# Patient Record
Sex: FEMALE | Race: WHITE | Employment: OTHER | URBAN - METROPOLITAN AREA
[De-identification: names, ages, dates, MRNs, and addresses within clinical notes are randomized per-mention and may not be internally consistent; named-entity substitution may affect disease eponyms.]

---

## 2017-01-04 ENCOUNTER — TELEPHONE (OUTPATIENT)
Dept: ENDOCRINOLOGY | Facility: CLINIC | Age: 52
End: 2017-01-04

## 2017-01-04 NOTE — TELEPHONE ENCOUNTER
Patient notified of lab results and recommendations. Sheela is agreeable with plan and has no further questions at this time.    Nilsa Lim LPN  Adult Endocrinology  [facility]      Per Gopal Result Note Message:  The thyroid hormone levels have normalized. I hope the hot flashes resolved. You may continue to take the same dose of levothyroxine.

## 2017-01-13 DIAGNOSIS — R27.0 ATAXIA: Primary | ICD-10-CM

## 2017-01-13 RX ORDER — BACLOFEN 20 MG/1
20 TABLET ORAL 3 TIMES DAILY
Qty: 90 TABLET | Refills: 3 | Status: SHIPPED | OUTPATIENT
Start: 2017-01-13 | End: 2017-06-02

## 2017-06-02 DIAGNOSIS — R27.0 ATAXIA: ICD-10-CM

## 2017-06-02 RX ORDER — BACLOFEN 20 MG/1
20 TABLET ORAL 3 TIMES DAILY
Qty: 90 TABLET | Refills: 3 | Status: SHIPPED | OUTPATIENT
Start: 2017-06-02 | End: 2017-10-01

## 2017-10-01 DIAGNOSIS — R27.0 ATAXIA: ICD-10-CM

## 2017-10-17 RX ORDER — BACLOFEN 20 MG/1
TABLET ORAL
Qty: 90 TABLET | Refills: 2 | Status: SHIPPED | OUTPATIENT
Start: 2017-10-17 | End: 2018-07-14

## 2017-11-09 DIAGNOSIS — E06.3 HASHIMOTO'S THYROIDITIS: ICD-10-CM

## 2017-11-10 RX ORDER — LEVOTHYROXINE SODIUM 112 UG/1
TABLET ORAL
Qty: 90 TABLET | Refills: 0 | Status: SHIPPED | OUTPATIENT
Start: 2017-11-10 | End: 2018-04-18

## 2017-11-10 NOTE — TELEPHONE ENCOUNTER
Detailed message left on patient voicemail to schedule follow up.    Nilsa Lim LPN  Adult Endocrinology  [facility]

## 2017-11-10 NOTE — TELEPHONE ENCOUNTER
Last Office Visit: 10/18/16  Future Appt: NONE    Will forward to Dr. Herron for review per Endocrine Refill Protocol.    Nilsa Lim LPN  Adult Endocrinology  [facility]

## 2018-01-30 ENCOUNTER — OFFICE VISIT (OUTPATIENT)
Dept: ENDOCRINOLOGY | Facility: CLINIC | Age: 53
End: 2018-01-30
Payer: COMMERCIAL

## 2018-01-30 VITALS
OXYGEN SATURATION: 100 % | DIASTOLIC BLOOD PRESSURE: 75 MMHG | HEIGHT: 62 IN | TEMPERATURE: 97.8 F | WEIGHT: 111.19 LBS | HEART RATE: 74 BPM | BODY MASS INDEX: 20.46 KG/M2 | SYSTOLIC BLOOD PRESSURE: 126 MMHG

## 2018-01-30 DIAGNOSIS — E06.3 HASHIMOTO'S THYROIDITIS: Primary | ICD-10-CM

## 2018-01-30 LAB
T4 FREE SERPL-MCNC: 1.07 NG/DL (ref 0.76–1.46)
TSH SERPL DL<=0.005 MIU/L-ACNC: 2.41 MU/L (ref 0.4–4)

## 2018-01-30 PROCEDURE — 84443 ASSAY THYROID STIM HORMONE: CPT | Performed by: INTERNAL MEDICINE

## 2018-01-30 PROCEDURE — 84439 ASSAY OF FREE THYROXINE: CPT | Performed by: INTERNAL MEDICINE

## 2018-01-30 PROCEDURE — 99213 OFFICE O/P EST LOW 20 MIN: CPT | Performed by: INTERNAL MEDICINE

## 2018-01-30 PROCEDURE — 36415 COLL VENOUS BLD VENIPUNCTURE: CPT | Performed by: INTERNAL MEDICINE

## 2018-01-30 NOTE — PROGRESS NOTES
The patient is seen in f/up for evaluation of hypothyroidism.     Sheela Davis is a 52 year old female with a past medical history of cerebellar ataxia and a family history of hypothyroidism, who was diagnosed with Hashimoto thyroiditis in August 2013. The thyroid ultrasound done in October 2013 revealed a heterogeneous, normal size thyroid gland.     The dose of levothyroxine was progressively increased to a maximum dose of 150 mcg daily, in 2015. Since 2015, the dose was gradually decreased to the current dose of 112 mcg daily, which the patient has been taking since 2016.     The patient reports being compliant in taking the levothyroxine on an empty stomach, a couple of hrs prior to breakfast, separate from multivitamins and calcium supplements.     The ataxia is progressively getting worse. She has been falling more frequently, up to 3-4 times a week. Denies fractures. She reports taking calcium and vitamin D supplements.     Past Medical History   Diagnosis Date     Headache      Depression      Urinary incontinence      Bowel obstruction surgeries - last 4/2014       Hepatitis A 1968     age 3   Cerebellar ataxia   Hashimoto thyroiditis  Raynaud syndrome    Past Surgical History:   Procedure Laterality Date     bowel obstruction surgery  2008 and 2009      SLING TRANSVAGINAL       SMALL BOWEL RESECTION  Jan/2013     thumb reconstruction     Partial hysterectomy in 1995     Current Medications  2000 units vitamin D daily  Prescription Medications as of 1/30/2018             levothyroxine (SYNTHROID/LEVOTHROID) 112 MCG tablet TAKE ONE TABLET BY MOUTH ONE TIME DAILY     baclofen (LIORESAL) 20 MG tablet TAKE ONE TABLET BY MOUTH THREE TIMES DAILY     escitalopram (LEXAPRO) 20 MG tablet Take 20 mg by mouth daily    multivitamin, therapeutic with minerals (MULTI-VITAMIN) TABS Take 1 tablet by mouth daily    buPROPion (WELLBUTRIN XL) 300 MG 24 hr tablet Take 150 mg by mouth 3 times daily     Calcium  Citrate-Vitamin D (CALCIUM CITRATE +D PO)     Naproxen Sodium (ALEVE PO) Take 220 mg by mouth as needed for moderate pain        Family History   Problem Relation Age of Onset     Hypothyroidism  Sisters      Psychiatry Son      ADHD     Neurological Daughter      migraines     Cardiovascular Mother      CHF     Diabetes in his 30's - type 1  Father      Diabetes dx in her late 50s - tx with insulin  Mother    2 sisters dx with insulin dependent diabetes. Many family members are overweight.     Social History   with 3 children. She denies smoking, drinking alcohol or using illicit drugs. Occupation: [employer].     Review of Systems   Systemic:             Weight stable; no significant fatigue  Eye:                      No eye symptoms   Carlito-Laryngeal:     Dysphagia for both solid and liquid foods on and off for the last 20 years - stable, no hoarseness, no cough     Breast:                  No breast symptoms  Cardiovascular:    No cardiovascular symptoms, no CP or palpitations   Pulmonary:           No pulmonary symptoms, no SOB or cough    Gastrointestinal:   No N/V, no diarrhea or constipation   Genitourinary:       Urinary incontinence, no increased thirst; urinates 1-2 times a night   Endocrine:            cold intolerance - all her life; rare hot flashes   Neurological:        headaches - 1 time a week, no tremor, occasional numbness in her hands, episodes of lightheadedness - present intermittently, sometimes lasting for days; they have been present since her teen years; now 3 times a week; h/o falls due to diff maintaining her balance  Musculoskeletal:  No joint pain; muscle pain in her legs; the left sternoclavicular joint prominence has been stable  Skin:                     No skin symptoms, no dry skin, no hair falling out   Psychological:     Depression - controlled, not suicidal               Vital Signs     Previous Weights:    Wt Readings from Last 10 Encounters:   01/30/18 50.4 kg (111 lb 3 oz)  "  10/18/16 53 kg (116 lb 13.5 oz)   05/20/16 49.8 kg (109 lb 12.8 oz)   10/13/15 51.8 kg (114 lb 4 oz)   10/14/14 51 kg (112 lb 8 oz)   04/11/14 48.1 kg (106 lb)   10/22/13 49.2 kg (108 lb 8 oz)   09/10/13 47.6 kg (105 lb)   08/09/13 46.7 kg (103 lb)        /75  Pulse 74  Temp 97.8  F (36.6  C)  Ht 1.575 m (5' 2\")  Wt 50.4 kg (111 lb 3 oz)  SpO2 100%  BMI 20.34 kg/m2     Physical Exam  General Appearance: she is well developed, thin, no distress noted; wide based gait as she transitions to the examining table   Eyes:  conjutivae and extra-ocular motions are normal.                                    pupils round and reactive to light, no lid lag, no stare    HEENT:   oropharynx clear and moist, no JVD, no bruits      thyroid mildly prominent, no definite nodules   Cardiovascular:  regular rhythm, no murmurs, distal pulse palpable, no edema  Respiratory:        chest clear, no rales, no rhonchi   Musculoskeletal:  normal tone and strength; prominent left sternoclavicular joint  Psychological:          affect and judgment normal  Skin:  no skin lesions   Neurological: knee reflexes symmetric and hyperactive, bicipital reflexes normal, no resting tremor.     Lab Results  I reviewed prior lab results documented in EPIC.     TSH   Date Value Ref Range Status   12/30/2016 2.68 0.40 - 4.00 mU/L Final     T4 Free   Date Value Ref Range Status   12/30/2016 0.97 0.76 - 1.46 ng/dL Final       Assessment     Hashimoto thyroiditis     Clinically, she does not have definite signs or symptoms suggestive of hyper or hypothyroidism. We are going to recheck the thyroid hormone levels today and adjust the dose of levothyroxine accordingly.    Orders Placed This Encounter   Procedures     TSH     T4 free                "

## 2018-01-30 NOTE — LETTER
1/30/2018         RE: Sheela Davis  [address] [place] [address]        Dear Colleague,    Thank you for referring your patient, Sheela Davis, to the [facility]. Please see a copy of my visit note below.      The patient is seen in f/up for evaluation of hypothyroidism.     Sheela Davis is a 52 year old female with a past medical history of cerebellar ataxia and a family history of hypothyroidism, who was diagnosed with Hashimoto thyroiditis in August 2013. The thyroid ultrasound done in October 2013 revealed a heterogeneous, normal size thyroid gland.     The dose of levothyroxine was progressively increased to a maximum dose of 150 mcg daily, in 2015. Since 2015, the dose was gradually decreased to the current dose of 112 mcg daily, which the patient has been taking since 2016.     The patient reports being compliant in taking the levothyroxine on an empty stomach, a couple of hrs prior to breakfast, separate from multivitamins and calcium supplements.     The ataxia is progressively getting worse. She has been falling more frequently, up to 3-4 times a week. Denies fractures. She reports taking calcium and vitamin D supplements.     Past Medical History   Diagnosis Date     Headache      Depression      Urinary incontinence      Bowel obstruction surgeries - last 4/2014       Hepatitis A 1968     age 3   Cerebellar ataxia   Hashimoto thyroiditis  Raynaud syndrome    Past Surgical History:   Procedure Laterality Date     bowel obstruction surgery  2008 and 2009      SLING TRANSVAGINAL       SMALL BOWEL RESECTION  Jan/2013     thumb reconstruction     Partial hysterectomy in 1995     Current Medications  2000 units vitamin D daily  Prescription Medications as of 1/30/2018             levothyroxine (SYNTHROID/LEVOTHROID) 112 MCG tablet TAKE ONE TABLET BY MOUTH ONE TIME DAILY     baclofen (LIORESAL) 20 MG tablet TAKE ONE TABLET BY MOUTH THREE TIMES DAILY      escitalopram (LEXAPRO) 20 MG tablet Take 20 mg by mouth daily    multivitamin, therapeutic with minerals (MULTI-VITAMIN) TABS Take 1 tablet by mouth daily    buPROPion (WELLBUTRIN XL) 300 MG 24 hr tablet Take 150 mg by mouth 3 times daily     Calcium Citrate-Vitamin D (CALCIUM CITRATE +D PO)     Naproxen Sodium (ALEVE PO) Take 220 mg by mouth as needed for moderate pain        Family History   Problem Relation Age of Onset     Hypothyroidism  Sisters      Psychiatry Son      ADHD     Neurological Daughter      migraines     Cardiovascular Mother      CHF     Diabetes in his 30's - type 1  Father      Diabetes dx in her late 50s - tx with insulin  Mother    2 sisters dx with insulin dependent diabetes. Many family members are overweight.     Social History   with 3 children. She denies smoking, drinking alcohol or using illicit drugs. Occupation: [employer].     Review of Systems   Systemic:             Weight stable; no significant fatigue  Eye:                      No eye symptoms   Carlito-Laryngeal:     Dysphagia for both solid and liquid foods on and off for the last 20 years - stable, no hoarseness, no cough     Breast:                  No breast symptoms  Cardiovascular:    No cardiovascular symptoms, no CP or palpitations   Pulmonary:           No pulmonary symptoms, no SOB or cough    Gastrointestinal:   No N/V, no diarrhea or constipation   Genitourinary:       Urinary incontinence, no increased thirst; urinates 1-2 times a night   Endocrine:            cold intolerance - all her life; rare hot flashes   Neurological:        headaches - 1 time a week, no tremor, occasional numbness in her hands, episodes of lightheadedness - present intermittently, sometimes lasting for days; they have been present since her teen years; now 3 times a week; h/o falls due to diff maintaining her balance  Musculoskeletal:  No joint pain; muscle pain in her legs; the left sternoclavicular joint prominence has been stable  Skin:  "                    No skin symptoms, no dry skin, no hair falling out   Psychological:     Depression - controlled, not suicidal               Vital Signs     Previous Weights:    Wt Readings from Last 10 Encounters:   01/30/18 50.4 kg (111 lb 3 oz)   10/18/16 53 kg (116 lb 13.5 oz)   05/20/16 49.8 kg (109 lb 12.8 oz)   10/13/15 51.8 kg (114 lb 4 oz)   10/14/14 51 kg (112 lb 8 oz)   04/11/14 48.1 kg (106 lb)   10/22/13 49.2 kg (108 lb 8 oz)   09/10/13 47.6 kg (105 lb)   08/09/13 46.7 kg (103 lb)        /75  Pulse 74  Temp 97.8  F (36.6  C)  Ht 1.575 m (5' 2\")  Wt 50.4 kg (111 lb 3 oz)  SpO2 100%  BMI 20.34 kg/m2     Physical Exam  General Appearance: she is well developed, thin, no distress noted; wide based gait as she transitions to the examining table   Eyes:  conjutivae and extra-ocular motions are normal.                                    pupils round and reactive to light, no lid lag, no stare    HEENT:   oropharynx clear and moist, no JVD, no bruits      thyroid mildly prominent, no definite nodules   Cardiovascular:  regular rhythm, no murmurs, distal pulse palpable, no edema  Respiratory:        chest clear, no rales, no rhonchi   Musculoskeletal:  normal tone and strength; prominent left sternoclavicular joint  Psychological:          affect and judgment normal  Skin:  no skin lesions   Neurological: knee reflexes symmetric and hyperactive, bicipital reflexes normal, no resting tremor.     Lab Results  I reviewed prior lab results documented in EPIC.     TSH   Date Value Ref Range Status   12/30/2016 2.68 0.40 - 4.00 mU/L Final     T4 Free   Date Value Ref Range Status   12/30/2016 0.97 0.76 - 1.46 ng/dL Final       Assessment     Hashimoto thyroiditis     Clinically, she does not have definite signs or symptoms suggestive of hyper or hypothyroidism. We are going to recheck the thyroid hormone levels today and adjust the dose of levothyroxine accordingly.    Orders Placed This Encounter "   Procedures     TSH     T4 free                    Again, thank you for allowing me to participate in the care of your patient.        Sincerely,        Megan Herron MD

## 2018-01-30 NOTE — PATIENT INSTRUCTIONS
Thank you for choosing the [facility], Department of Endocrinology. It has been a pleasure servicing you today. Please contact us at [phone] with any questions. If you need to speak to an Endocrinologist and it is after 5:00 pm or on a weekend, please call the On-Call Endocrinologist at [phone].

## 2018-01-30 NOTE — NURSING NOTE
"Sheela Davis's goals for this visit include:   Chief Complaint   Patient presents with     RECHECK       She requests these members of her care team be copied on today's visit information: Alfredo Marrero      PCP: Alfredo Marrero    Referring Provider:  No referring provider defined for this encounter.    Chief Complaint   Patient presents with     RECHECK       Initial /75  Pulse 74  Temp 97.8  F (36.6  C)  Ht 1.575 m (5' 2\")  Wt 50.4 kg (111 lb 3 oz)  SpO2 100%  BMI 20.34 kg/m2 Estimated body mass index is 20.34 kg/(m^2) as calculated from the following:    Height as of this encounter: 1.575 m (5' 2\").    Weight as of this encounter: 50.4 kg (111 lb 3 oz).  Medication Reconciliation: complete    Do you need any medication refills at today's visit? No    Betty Villarreal LPN      "

## 2018-01-30 NOTE — MR AVS SNAPSHOT
After Visit Summary   1/30/2018    Sheela Davis    MRN: [id number]           Patient Information     Date Of Birth          1965        Visit Information        Provider Department      1/30/2018 2:30 PM Megan Herron MD [facility]        Today's Diagnoses     Hashimoto's thyroiditis    -  1      Care Instructions    Thank you for choosing the [facility] Department of Endocrinology. It has been a pleasure servicing you today. Please contact us at [phone] with any questions. If you need to speak to an Endocrinologist and it is after 5:00 pm or on a weekend, please call the On-Call Endocrinologist at [phone].            Follow-ups after your visit        Follow-up notes from your care team     Return in about 1 year (around 1/30/2019) for Labs Today.      Your next 10 appointments already scheduled     Jan 29, 2019 11:30 AM CST   Return Visit with Megan Herron MD   [facility]    [address] [place] [id number] [phone]              Future tests that were ordered for you today     Open Future Orders        Priority Expected Expires Ordered    TSH Routine 1/30/2018 1/30/2019 1/30/2018    T4 free Routine 1/30/2018 1/30/2019 1/30/2018            Who to contact     If you have questions or need follow up information about today's clinic visit or your schedule please contact [facility] directly at [phone].  Normal or non-critical lab and imaging results will be communicated to you by MyChart, letter or phone within 4 business days after the clinic has received the results. If you do not hear from us within 7 days, please contact the clinic through MyChart or phone. If you have a critical or abnormal lab result, we will notify you by phone as soon as possible.  Submit refill requests through [employer] or call your pharmacy and they will  "forward the refill request to us. Please allow 3 business days for your refill to be completed.          Additional Information About Your Visit        BzzAgenthart Information     Testt is an electronic gateway that provides easy, online access to your medical records. With Testt, you can request a clinic appointment, read your test results, renew a prescription or communicate with your care team.     To sign up for Testt visit the website at [email or web address]   You will be asked to enter the access code listed below, as well as some personal information. Please follow the directions to create your username and password.     Your access code is: [email or web address]  Expires: 2018  2:51 PM     Your access code will  in 90 days. If you need help or a new code, please contact your [facility] or call [phone] for assistance.        Care EveryWhere ID     This is your Care EveryWhere ID. This could be used by other organizations to access your [facility] medical records  [id number]        Your Vitals Were     Pulse Temperature Height Pulse Oximetry BMI (Body Mass Index)       74 97.8  F (36.6  C) 1.575 m (5' 2\") 100% 20.34 kg/m2        Blood Pressure from Last 3 Encounters:   18 126/75   10/18/16 103/60   16 101/61    Weight from Last 3 Encounters:   18 50.4 kg (111 lb 3 oz)   10/18/16 53 kg (116 lb 13.5 oz)   16 49.8 kg (109 lb 12.8 oz)               Primary Care Provider Office Phone # Fax #    Alfredo Marrero [phone]       [address] [address]        Equal Access to Services     FABI HERNANDEZ : Hadii latrell Diaz, washivada mohan, qasolo kaalmada marilou, gia glaser. So [facility] [phone].    ATENCIÓN: Si habla español, tiene a shelton disposición servicios gratuitos de asistencia lingüística. Llame al [phone].    We comply with applicable federal civil rights " laws and [place] laws. We do not discriminate on the basis of race, color, national origin, age, disability, sex, sexual orientation, or gender identity.            Thank you!     Thank you for choosing [facility]  for your care. Our goal is always to provide you with excellent care. Hearing back from our patients is one way we can continue to improve our services. Please take a few minutes to complete the written survey that you may receive in the mail after your visit with us. Thank you!             Your Updated Medication List - Protect others around you: Learn how to safely use, store and throw away your medicines at www.disposemymeds.org.          This list is accurate as of 1/30/18  2:51 PM.  Always use your most recent med list.                   Brand Name Dispense Instructions for use Diagnosis    ALEVE PO      Take 220 mg by mouth as needed for moderate pain        baclofen 20 MG tablet    LIORESAL    90 tablet    TAKE ONE TABLET BY MOUTH THREE TIMES DAILY    Ataxia       buPROPion 300 MG 24 hr tablet    WELLBUTRIN XL     Take 150 mg by mouth 3 times daily    Cerebellar ataxia (H)       CALCIUM CITRATE +D PO       Cerebellar ataxia (H)       escitalopram 20 MG tablet    LEXAPRO     Take 20 mg by mouth daily        levothyroxine 112 MCG tablet    SYNTHROID/LEVOTHROID    90 tablet    TAKE ONE TABLET BY MOUTH ONE TIME DAILY    Hashimoto's thyroiditis       Multi-vitamin Tabs tablet      Take 1 tablet by mouth daily    Ataxia

## 2018-01-30 NOTE — LETTER
1/30/2018       RE: Sheela Davis  [address] [place] [address]     Dear Colleague,    Thank you for referring your patient, Sheela Davis, to the [facility]. Please see a copy of my visit note below.      The patient is seen in f/up for evaluation of hypothyroidism.     Sheela Davis is a 52 year old female with a past medical history of cerebellar ataxia and a family history of hypothyroidism, who was diagnosed with Hashimoto thyroiditis in August 2013. The thyroid ultrasound done in October 2013 revealed a heterogeneous, normal size thyroid gland.     The dose of levothyroxine was progressively increased to a maximum dose of 150 mcg daily, in 2015. Since 2015, the dose was gradually decreased to the current dose of 112 mcg daily, which the patient has been taking since 2016.     The patient reports being compliant in taking the levothyroxine on an empty stomach, a couple of hrs prior to breakfast, separate from multivitamins and calcium supplements.     The ataxia is progressively getting worse. She has been falling more frequently, up to 3-4 times a week. Denies fractures. She reports taking calcium and vitamin D supplements.     Past Medical History   Diagnosis Date     Headache      Depression      Urinary incontinence      Bowel obstruction surgeries - last 4/2014       Hepatitis A 1968     age 3   Cerebellar ataxia   Hashimoto thyroiditis  Raynaud syndrome    Past Surgical History:   Procedure Laterality Date     bowel obstruction surgery  2008 and 2009      SLING TRANSVAGINAL       SMALL BOWEL RESECTION  Jan/2013     thumb reconstruction     Partial hysterectomy in 1995     Current Medications  2000 units vitamin D daily  Prescription Medications as of 1/30/2018             levothyroxine (SYNTHROID/LEVOTHROID) 112 MCG tablet TAKE ONE TABLET BY MOUTH ONE TIME DAILY     baclofen (LIORESAL) 20 MG tablet TAKE ONE  TABLET BY MOUTH THREE TIMES DAILY     escitalopram (LEXAPRO) 20 MG tablet Take 20 mg by mouth daily    multivitamin, therapeutic with minerals (MULTI-VITAMIN) TABS Take 1 tablet by mouth daily    buPROPion (WELLBUTRIN XL) 300 MG 24 hr tablet Take 150 mg by mouth 3 times daily     Calcium Citrate-Vitamin D (CALCIUM CITRATE +D PO)     Naproxen Sodium (ALEVE PO) Take 220 mg by mouth as needed for moderate pain        Family History   Problem Relation Age of Onset     Hypothyroidism  Sisters      Psychiatry Son      ADHD     Neurological Daughter      migraines     Cardiovascular Mother      CHF     Diabetes in his 30's - type 1  Father      Diabetes dx in her late 50s - tx with insulin  Mother    2 sisters dx with insulin dependent diabetes. Many family members are overweight.     Social History   with 3 children. She denies smoking, drinking alcohol or using illicit drugs. Occupation: [employer].     Review of Systems   Systemic:             Weight stable; no significant fatigue  Eye:                      No eye symptoms   Carlito-Laryngeal:     Dysphagia for both solid and liquid foods on and off for the last 20 years - stable, no hoarseness, no cough     Breast:                  No breast symptoms  Cardiovascular:    No cardiovascular symptoms, no CP or palpitations   Pulmonary:           No pulmonary symptoms, no SOB or cough    Gastrointestinal:   No N/V, no diarrhea or constipation   Genitourinary:       Urinary incontinence, no increased thirst; urinates 1-2 times a night   Endocrine:            cold intolerance - all her life; rare hot flashes   Neurological:        headaches - 1 time a week, no tremor, occasional numbness in her hands, episodes of lightheadedness - present intermittently, sometimes lasting for days; they have been present since her teen years; now 3 times a week; h/o falls due to diff maintaining her balance  Musculoskeletal:  No joint pain; muscle pain in her legs; the left sternoclavicular  "joint prominence has been stable  Skin:                     No skin symptoms, no dry skin, no hair falling out   Psychological:     Depression - controlled, not suicidal               Vital Signs     Previous Weights:    Wt Readings from Last 10 Encounters:   01/30/18 50.4 kg (111 lb 3 oz)   10/18/16 53 kg (116 lb 13.5 oz)   05/20/16 49.8 kg (109 lb 12.8 oz)   10/13/15 51.8 kg (114 lb 4 oz)   10/14/14 51 kg (112 lb 8 oz)   04/11/14 48.1 kg (106 lb)   10/22/13 49.2 kg (108 lb 8 oz)   09/10/13 47.6 kg (105 lb)   08/09/13 46.7 kg (103 lb)        /75  Pulse 74  Temp 97.8  F (36.6  C)  Ht 1.575 m (5' 2\")  Wt 50.4 kg (111 lb 3 oz)  SpO2 100%  BMI 20.34 kg/m2     Physical Exam  General Appearance: she is well developed, thin, no distress noted; wide based gait as she transitions to the examining table   Eyes:  conjutivae and extra-ocular motions are normal.                                    pupils round and reactive to light, no lid lag, no stare    HEENT:   oropharynx clear and moist, no JVD, no bruits      thyroid mildly prominent, no definite nodules   Cardiovascular:  regular rhythm, no murmurs, distal pulse palpable, no edema  Respiratory:        chest clear, no rales, no rhonchi   Musculoskeletal:  normal tone and strength; prominent left sternoclavicular joint  Psychological:          affect and judgment normal  Skin:  no skin lesions   Neurological: knee reflexes symmetric and hyperactive, bicipital reflexes normal, no resting tremor.     Lab Results  I reviewed prior lab results documented in EPIC.     TSH   Date Value Ref Range Status   12/30/2016 2.68 0.40 - 4.00 mU/L Final     T4 Free   Date Value Ref Range Status   12/30/2016 0.97 0.76 - 1.46 ng/dL Final       Assessment     Hashimoto thyroiditis     Clinically, she does not have definite signs or symptoms suggestive of hyper or hypothyroidism. We are going to recheck the thyroid hormone levels today and adjust the dose of levothyroxine " accordingly.    Orders Placed This Encounter   Procedures     TSH     T4 free                    Again, thank you for allowing me to participate in the care of your patient.      Sincerely,    Megan Herron MD

## 2018-04-18 DIAGNOSIS — E06.3 HASHIMOTO'S THYROIDITIS: ICD-10-CM

## 2018-04-19 RX ORDER — LEVOTHYROXINE SODIUM 112 UG/1
TABLET ORAL
Qty: 90 TABLET | Refills: 1 | Status: SHIPPED | OUTPATIENT
Start: 2018-04-19 | End: 2018-10-15

## 2018-07-14 DIAGNOSIS — R27.0 ATAXIA: ICD-10-CM

## 2018-07-17 RX ORDER — BACLOFEN 20 MG/1
TABLET ORAL
Qty: 90 TABLET | Refills: 1 | Status: SHIPPED | OUTPATIENT
Start: 2018-07-17 | End: 2018-09-14

## 2018-08-10 ENCOUNTER — TELEPHONE (OUTPATIENT)
Dept: NEUROLOGY | Facility: CLINIC | Age: 53
End: 2018-08-10

## 2018-08-10 NOTE — TELEPHONE ENCOUNTER
Health Call Center    Phone Message    May a detailed message be left on voicemail: yes    Reason for Call: Medication Refill Request    Has the patient contacted the pharmacy for the refill? Yes   Name of medication being requested: baclofen (LIORESAL) 20 MG   Provider who prescribed the medication: Dr. Oviedo  Pharmacy: [id number] [place] [id number] [facility]  Date medication is needed: As soon as possible         Action Taken: Message routed to:  [facility]

## 2018-08-10 NOTE — TELEPHONE ENCOUNTER
Called pt and had to leave a message that she needs to make a return appointment with Dr. Oviedo to receive more refills on her baclofen since has been 2 years since her last appointment.  Called her pharmacy and she has enough medication to last until Sept. 18.  Left phone number for pt to return call to make an appointment.

## 2018-08-14 NOTE — TELEPHONE ENCOUNTER
[facility] Call Center    Phone Message    May a detailed message be left on voicemail: yes    Reason for Call: Other: Calling back the Ataxia clinic to schedule.  Please call this Pt today anytime and tomorrow after 4pm.     Action Taken: Message routed to:  [facility] Neuro  Called pt at 2:30pm and no answer, message was left.    Called pt on 8/17/2018 to arrange return appointment in the ataxia clinic, no answer message left and also asked if there was another phone number that could be used to get a hold of the patient.    Called pt again and also her 's phone to try and arrange a follow up appointment. No answer, message was left on both phones to return call and if there is another number that Shavon can be reached.        Called pt's cell phone and her 's phone and no answer. Left message that Sheela has been scheduled for Sept 14 at 9:00am to see Dr. Oviedo. Pt is running out of medication and needs to be seen again. Asked them to call this writer back.

## 2018-09-08 DIAGNOSIS — R27.0 ATAXIA: ICD-10-CM

## 2018-09-11 ENCOUNTER — TELEPHONE (OUTPATIENT)
Dept: NEUROLOGY | Facility: CLINIC | Age: 53
End: 2018-09-11

## 2018-09-11 RX ORDER — BACLOFEN 20 MG/1
TABLET ORAL
Qty: 90 TABLET | Refills: 0 | OUTPATIENT
Start: 2018-09-11

## 2018-09-11 NOTE — TELEPHONE ENCOUNTER
Called pt's home and 's cell phone again to remind them that pt has an appointment on Sept 14 at 9am with Dr. Oviedo. She has not been here for over a year and needs to be seen to have baclofen refilled.  No answer at either number.  Called pharmacy and gave this information to pharmacist also. They will also try and get a hold of pt to tell her about Sept 14 appointment.

## 2018-09-11 NOTE — TELEPHONE ENCOUNTER
Pt has been called several times. An appointment has been made for her to see Dr. Oviedo on Sept 14 at 9am. Pharmacy has been notified. Messages left on both pt and pt's husbands phone in regards to appointment.

## 2018-09-13 ENCOUNTER — PATIENT OUTREACH (OUTPATIENT)
Dept: CARE COORDINATION | Facility: CLINIC | Age: 53
End: 2018-09-13

## 2018-09-14 ENCOUNTER — OFFICE VISIT (OUTPATIENT)
Dept: NEUROLOGY | Facility: CLINIC | Age: 53
End: 2018-09-14
Payer: MEDICARE

## 2018-09-14 ENCOUNTER — NURSE TRIAGE (OUTPATIENT)
Dept: NURSING | Facility: CLINIC | Age: 53
End: 2018-09-14

## 2018-09-14 VITALS
DIASTOLIC BLOOD PRESSURE: 68 MMHG | BODY MASS INDEX: 20.02 KG/M2 | OXYGEN SATURATION: 96 % | HEIGHT: 63 IN | SYSTOLIC BLOOD PRESSURE: 106 MMHG | TEMPERATURE: 97.4 F | WEIGHT: 113 LBS | HEART RATE: 81 BPM

## 2018-09-14 DIAGNOSIS — R25.2 SPASTICITY: ICD-10-CM

## 2018-09-14 DIAGNOSIS — R27.0 ATAXIA: Primary | ICD-10-CM

## 2018-09-14 DIAGNOSIS — G11.9 CEREBELLAR ATAXIA (H): ICD-10-CM

## 2018-09-14 PROBLEM — F34.1 PERSISTENT DEPRESSIVE DISORDER: Status: ACTIVE | Noted: 2018-02-09

## 2018-09-14 LAB — THYROPEROXIDASE AB SERPL-ACNC: 922 IU/ML

## 2018-09-14 PROCEDURE — 86376 MICROSOMAL ANTIBODY EACH: CPT | Performed by: PSYCHIATRY & NEUROLOGY

## 2018-09-14 PROCEDURE — 40000803 ZZHCL STATISTIC DNA ISOL HIGH PURITY: Performed by: GENETIC COUNSELOR, MS

## 2018-09-14 RX ORDER — OMEPRAZOLE 10 MG/1
20 CAPSULE, DELAYED RELEASE ORAL DAILY
Qty: 30 CAPSULE | Refills: 0 | Status: SHIPPED | OUTPATIENT
Start: 2018-09-14

## 2018-09-14 RX ORDER — BACLOFEN 20 MG/1
20 TABLET ORAL 3 TIMES DAILY
Qty: 90 TABLET | Refills: 3 | Status: SHIPPED | OUTPATIENT
Start: 2018-09-14 | End: 2019-01-08

## 2018-09-14 RX ORDER — PREDNISONE 10 MG/1
10 TABLET ORAL SEE ADMIN INSTRUCTIONS
Qty: 60 EACH | Refills: 0 | Status: SHIPPED | OUTPATIENT
Start: 2018-09-14 | End: 2020-05-13

## 2018-09-14 ASSESSMENT — PAIN SCALES - GENERAL: PAINLEVEL: NO PAIN (0)

## 2018-09-14 NOTE — LETTER
9/14/2018       RE: Sheela Davis  [address] [place] [address]     Dear Colleague,    Thank you for referring your patient, Sheela Davis, to the [facility] at [facility]. Please see a copy of my visit note below.    GENETIC COUNSELING-Ataxia clinic  I met again today with Sheela Davis to review family history and discuss genetic testing options.  We spent 15 minutes together in clinic reviewing genetic testing and updating family history.    PRIOR GENETIC TESTING RESULTS:  Sheela previously had the autosomal dominant ataxia panel through [employer].  This testing excluded the diagnoses of SCA1 (30 and 31 repeats),   SCA2 (22 and 22 repeats), SCA3 (14 and 22 repeats), SCA6 (11 and 12 repeats),   SCA7 (10 and 10 repeats), SCA8 (24 and 27 repeats), SCA10 (12 and 14 repeats),   SCA14 (normal sequence), SCA17 (36 and 38 repeats), and DRPLA (14 and 16   repeats).      She had been previously tested for ataxia and spasticity by next generation sequencing   AFG3L2, ANO10, APTX, ATL1, RUT, BSCL2,K88WHE4, CABC1, CYP7B1, EEF2, FA2H, FGF14, FXN, GJC2, HSPD1, IFRD1,  ITPR1, SZDW9050, MBAE9665, KIF5A, L1CAM, NIPA1, OPA1, PDYN, PEX10, PEX2,  PLP1, PNPLA6, POLG, REEP1, SACS, SETX, UTD40B6, SPAST, SPG11, SPG20,  SPG21, SPG7, SYNE1, TGM6, TTBK2, ZFYVE26, ZFYVE27    this testing identified a single variant of uncertain clinical significance in the SYNE1 gene [SYNE1:NM_182961.2 c.92110F>C p.Dig4953Gvl].  Since the time that testing was performed, there is additional evidence that this single variant is not likely to be the cause of her symptoms- it is present in at least 1 homozygous individual in the gnomAD database.    I updated her family history. Since we last reviewed her family history, her oldest daughter has had a healthy son who is now 4 years old.  She reports no other family members that clearly have the same thing as her in terms of  neurologic disease.    We discussed additional testing that could be performed. I explained that there are several dozen newer genes that have been discovered that cause ataxia and/or spasticity.  In addition, her initial testing did not include assessment for copy number variation, which is an important cause of some forms of ataxia and HSP.  We discussed the risks, benefits, limitations, and utility of genetic testing.  Sheela's very young age of onset (22) strongly argues for a genetic basis for her symptoms.  Identifying a precise diagnosis would allow us to better define her prognosis and would provide critical reproductive information as her children are now having children of their own.  We discussed autosomal dominant and autosomal recessive patterns of inheritance and the implications of these patterns in terms of her children's risks.  We discussed possible results including positive, negative, and uncertain results.    She provided written consent for this testing and we will check on prior authorization.    We are requesting prior authorization for the following:    Gene list   ABCB7,ABHD12,ACO2,ADAR,ADCK3,AFG3L2,AIFM1,BCPW29J5,AMPD2,ANO10,AP4B1,AP4E1,AP4M1,AP4S1,AP5Z1,APTX,QHY9TN7,ARSI,ATCAY,ATL1,RUT,IVR47K0,ATP2B3,ATP7B,AT,U9TJWPA4,BICD2,BSCL2,C03win5,L24cnv31,D77rxv01,CA8,AWSLP9X,MMBAH2M,CACNB4,CAMTA1,CAPN1,OIYY30C,CCT5,CLCN2,COQ2,CPT1C,ARS59L9,AJX97L3,CYP2U1,CYP7B1,DDHD1,DDHD2,DNAJC19,DNAJC3,DSTYK,EBF3,EEF2,ELOVL4,ELOVL5,ENTPD1,EPT1,ERLIN1,ERLIN2,FA2H,FARS2,FAT2,FGF14,FLRT1,FLVCR1,FXN,GBA2,GJC2,GM2A,GRID2,GRM1,HACE1,HEXA,HEXB,HSPD1,IBA57,IFIH1,IFRD1,ITPR1,KCNA1,KCNA2,KCNC3,KCND3,KCNJ10,NHFV4391,DPIG8952,LRJREN048,KIF1A,KIF1C,KIF5A,L1CAM,LYST,MAG,MARS,MARS2,MME,MRE11A,MTPAP,NIPA1,NPC1,NPC2,NT5C2,OPA1,PCNA,PDYN,PIK3R5,PLD3,PLP1,PNKP,PNPLA6,POLG,POLR3A,PRICKLE1,PRKCG,PTRH2,QUP1VVO1,REEP1,REEP2,QTFWZE4V,MGA937,NAJ700,RNU12,RORA,RTN2,SACS,SAMD9L,SCN8A,SCYL1,SERAC1,SETX,SIL1,SFU41T0,SLC1A3,FZS54X1,SLC9A1,SNX14,SPAST,SPG11,SPG20,SPG21,SPG7,SPTBN2,STUB1,SYNE1,SYT14,TDP1,TDP2,TECPR2,TENM3,TFG,TGM6,THSX387,EOMMLJ54,TRPC3,TTBK2,TTPA,TUBB2A,TUBB4A,UBA5,UCHL1,USP8,VAMP1,VLDLR,VPS37A,VWA3B,WDR48,WDR81,WWOX,XRCC1,ZFR,ZFYVE26,ZFYVE27,UZG471    CPT codes  81404x8, 81405x7, 81406x12, 81407x6, 81408x2, 81479x311    Again, thank you for allowing me to participate in the care of your patient.      Sincerely,    Tanner Leggett, GC

## 2018-09-14 NOTE — NURSING NOTE
Pt states she does have headaches off and on. She said her vision is fine. She said she chokes when she eats and drinks, more so when she drink. She had a swallow study once years ago. She said her arm strength remains the same. She does lift weights every day.She works as a  so she is constantly moving. She said she falls down almost every day. She gets off balance. She uses her cane at work. 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 She uses a walker at home outside. She said she has problems with urology, she is going to see a urologist next week and is having a colonoscopy soon. She said the baclofen is helping but she said she doesn't feel differently if she doesn't take it.

## 2018-09-14 NOTE — PATIENT INSTRUCTIONS
Plan:   - Lab work: anti-TPO Ab, anti-thyroglobulin Ab  - Prednisone trial:    - Take 60mg x 3 days, 50mg x 3 days, 40mg x 3 days, 30mg x 3 days, 20mg x 3 days, 10mg x 3 days, 5mg x 3 day then stop.   - Take over the counter Omeprazole while taking the prednisone. You may stop after the prednisone is complete.   - Journal your symptoms during this time to see if you have a response to prednisone. Test yourself by doing the tandem stand and time how long you can stand unaided. Do this at least once a day.    - Please call the clinic and let Annalise (nurse) know how you are doing after your prednisone taper is completed  - Discussed trial of medical cannabis for treatment of BLE spasticity   - After you have completed the prednisone, you may consider decreasing baclofen to 10 mg three times a day x 1 week then off    - Restart if spasticity worsens   - Restart at 10mg three times a day x 1 week then increase to 20mg three times a day and continue on this thereafter    We will see you back 3 months.

## 2018-09-14 NOTE — PROGRESS NOTES
GENETIC COUNSELING-Ataxia clinic  I met again today with Sheela Davis to review family history and discuss genetic testing options.  We spent 15 minutes together in clinic reviewing genetic testing and updating family history.    PRIOR GENETIC TESTING RESULTS:  Sheela previously had the autosomal dominant ataxia panel through [employer].  This testing excluded the diagnoses of SCA1 (30 and 31 repeats),   SCA2 (22 and 22 repeats), SCA3 (14 and 22 repeats), SCA6 (11 and 12 repeats),   SCA7 (10 and 10 repeats), SCA8 (24 and 27 repeats), SCA10 (12 and 14 repeats),   SCA14 (normal sequence), SCA17 (36 and 38 repeats), and DRPLA (14 and 16   repeats).      She had been previously tested for ataxia and spasticity by next generation sequencing   AFG3L2, ANO10, APTX, ATL1, RUT, BSCL2,F97CXH4, CABC1, CYP7B1, EEF2, FA2H, FGF14, FXN, GJC2, HSPD1, IFRD1,  ITPR1, FWZB9526, OJYC6630, KIF5A, L1CAM, NIPA1, OPA1, PDYN, PEX10, PEX2,  PLP1, PNPLA6, POLG, REEP1, SACS, SETX, OKL89C0, SPAST, SPG11, SPG20,  SPG21, SPG7, SYNE1, TGM6, TTBK2, ZFYVE26, ZFYVE27    this testing identified a single variant of uncertain clinical significance in the SYNE1 gene [SYNE1:NM_182961.2 c.69401Z>C p.Rvm2902Sfs].  Since the time that testing was performed, there is additional evidence that this single variant is not likely to be the cause of her symptoms- it is present in at least 1 homozygous individual in the gnomAD database.    I updated her family history. Since we last reviewed her family history, her oldest daughter has had a healthy son who is now 4 years old.  She reports no other family members that clearly have the same thing as her in terms of neurologic disease.    We discussed additional testing that could be performed. I explained that there are several dozen newer genes that have been discovered that cause ataxia and/or spasticity.  In addition, her initial testing did not include assessment for copy number variation, which is an  important cause of some forms of ataxia and HSP.  We discussed the risks, benefits, limitations, and utility of genetic testing.  Sheela's very young age of onset (22) strongly argues for a genetic basis for her symptoms.  Identifying a precise diagnosis would allow us to better define her prognosis and would provide critical reproductive information as her children are now having children of their own.  We discussed autosomal dominant and autosomal recessive patterns of inheritance and the implications of these patterns in terms of her children's risks.  We discussed possible results including positive, negative, and uncertain results.    She provided written consent for this testing and we will check on prior authorization.    Jorge Leggett MS, [facility]  Licensed Genetic Counselor    We are requesting prior authorization for the following:    Gene list  ABCB7,ABHD12,ACO2,ADAR,ADCK3,AFG3L2,AIFM1,BICA11Q0,AMPD2,ANO10,AP4B1,AP4E1,AP4M1,AP4S1,AP5Z1,APTX,LJZ6XE5,ARSI,ATCAY,ATL1,RUT,GHL24O6,ATP2B3,ATP7B,AT,B7IYPBI7,BICD2,BSCL2,P35irf1,O98teh19,C82tev40,CA8,WKPFK1F,YKWSY3B,CACNB4,CAMTA1,CAPN1,GNGT58J,CCT5,CLCN2,COQ2,CPT1C,GZV53S9,JWZ63L2,CYP2U1,CYP7B1,DDHD1,DDHD2,DNAJC19,DNAJC3,DSTYK,EBF3,EEF2,ELOVL4,ELOVL5,ENTPD1,EPT1,ERLIN1,ERLIN2,FA2H,FARS2,FAT2,FGF14,FLRT1,FLVCR1,FXN,GBA2,GJC2,GM2A,GRID2,GRM1,HACE1,HEXA,HEXB,HSPD1,IBA57,IFIH1,IFRD1,ITPR1,KCNA1,KCNA2,KCNC3,KCND3,KCNJ10,OVOV0640,OTOA5025,CZCUXZ705,KIF1A,KIF1C,KIF5A,L1CAM,LYST,MAG,MARS,MARS2,MME,MRE11A,MTPAP,NIPA1,NPC1,NPC2,NT5C2,OPA1,PCNA,PDYN,PIK3R5,PLD3,PLP1,PNKP,PNPLA6,POLG,POLR3A,PRICKLE1,PRKCG,PTRH2,FEL8PFW6,REEP1,REEP2,EQTLGG3C,BHD785,TGA595,RNU12,RORA,RTN2,SACS,SAMD9L,SCN8A,SCYL1,SERAC1,SETX,SIL1,UTD93W7,SLC1A3,GRK30Q3,SLC9A1,SNX14,SPAST,SPG11,SPG20,SPG21,SPG7,SPTBN2,STUB1,SYNE1,SYT14,TDP1,TDP2,TECPR2,TENM3,TFG,TGM6,KIVJ568,JZJZJY49,TRPC3,TTBK2,TTPA,TUBB2A,TUBB4A,UBA5,UCHL1,USP8,VAMP1,VLDLR,VPS37A,VWA3B,WDR48,WDR81,WWOX,XRCC1,ZFR,ZFYVE26,ZFYVE27,AIR190    CPT  codes  81404x8, 81405x7, 81406x12, 81407x6, 81408x2, 81479x311

## 2018-09-14 NOTE — LETTER
2018       RE: Sheela Davis  [address] [place] [address]     Dear Colleague,    Thank you for referring your patient, Sheela Davis, to the [facility] NEUROLOGY at [facility]. Please see a copy of my visit note below.    Department of Neurology  Movement Disorders Division     Patient: Sheela Davis   MRN: [id number]   : 1965   Date of Visit: 2018     History of Present Illness  Ms. Davis is a 53 year old L handed female with PMH of depression, hepatitis A, Hashimoto thyroiditis that presents to Neurology Movement clinic for follow up regarding management of cerebellar ataxia. Patient was last seen on 2016 where he was continued on baclofen 20mg tid, which she has been on for several years for BLE spasticity. She was also trialed on a prednisone taper for possible autoimmune related cerebellar atrophy. She was also referred to PT, swallow study and a sleep study.     History obtained from patient and accompanied by . Patient reports that she looses her balance every day and may be associated with a fall. She uses a cane with most walks and a walker outside. She last fell a few days ago. She tried to exercise every night. Doesn't recall if prednisone helped, doesn't think so. Baclofen doesn't notice a difference even after skipping for 3 days many years ago. Many days skips her afternoon dose.     She reports that PT helped but doesn't continue exercises after PT is done. Swallow study completed with no issues. Reports choking on liquids more than food when she eats too fast, about once to twice a week or every few weeks. She reports if she eats slow, swallowing is not an issue. Maintains weight.    Sleep study never completed. No issues with sleep currently. Going to see a urologist due to waking up 3 times a night to urinate.    She doesn't drive.     PMH: unchanged  PSH: unchanged  FH: unchanged  SH: unchanged      Review  "of Systems:  Other than that mentioned above, the remainder of 12 systems reviewed were negative.    Medications:  Current Outpatient Prescriptions   Medication Sig Dispense Refill     baclofen (LIORESAL) 20 MG tablet TAKE ONE TABLET BY MOUTH THREE TIMES DAILY 90 tablet 1     buPROPion (WELLBUTRIN XL) 300 MG 24 hr tablet Take 150 mg by mouth 3 times daily        Calcium Citrate-Vitamin D (CALCIUM CITRATE +D PO) Take by mouth daily        escitalopram (LEXAPRO) 20 MG tablet Take 20 mg by mouth daily       levothyroxine (SYNTHROID/LEVOTHROID) 112 MCG tablet TAKE ONE TABLET BY MOUTH ONE TIME DAILY 90 tablet 1     multivitamin, therapeutic with minerals (MULTI-VITAMIN) TABS Take 1 tablet by mouth daily       Naproxen Sodium (ALEVE PO) Take 220 mg by mouth as needed for moderate pain        Physical Exam:  The patient's  height is 1.588 m (5' 2.5\") and weight is 51.3 kg (113 lb). Her oral temperature is 97.4  F (36.3  C). Her blood pressure is 106/68 and her pulse is 81. Her oxygen saturation is 96%.      General: Resting comfortably   Respiratory: No respiratory distress     Neuro Exam:  Mental status: Alert and oriented to person, place, time, and situation. Follows commands  Speech: Fluent, intact comprehension and mild to moderate lingual dysarthria  CN: EOMIB, PERRL, facial sensation intact, facial movement symmetric, hearing grossly intact, tongue protrudes midline   Motor: Moves all extremities equally against gravity without difficulty or abnormalities, increased spasticity in BLE with difficulty relaxing   Difficulty with Luria three step test b/l  Reflexes (R/L): 3/3  in biceps, brachioradialis, triceps, patellars, achilles; no clonus, toes up going  Sensation: intact to light touch throughout   Coordination: FTN and HTN with ataxia R > L, able to stand with feet apart unassisted, able to  tandem b/l for a few seconds then loses balance   Gait: able to rise unassisted from a seated position, " decreased/restricted arm swing and decreased length of gait, no en bloc turns, ataxic spastic gait     Impression:  Ms. Davis is a 53 year old L handed female with PMH of depression, hepatitis A, Hashimoto thyroiditis that presents to Neurology Movement clinic for follow up regarding management of cerebellar ataxia.    1. Cerebellar ataxia: Symptoms started in 1988 and presented with slurred speech with gradual progression of gait difficulty that began in 1996. Patient has had a sufficient genetic test for SCA (SCA1, 23, 6, 7, 8, 10, 14, 17, DRPLA) and SPG, which were all negative. Her SCAR8 variant has low likelihood of causing her symptoms based on current knowledge. Past tests for anti-thyroglobulin Ab (150) and anti-TPO Ab (640) were both elevated. We will trial prednisone for  possible autoimmune related cerebellar atrophy.   2. BLE spasticity: Secondary to #1. She has been taking baclofen 20 mg TID for several years.  3. Dysphagia to liquids: Currently not an issue if she drinks slowly.   4. Hashimoto thyroiditis, w/ positive TPO: follows with endocrinology     Plan:   - Lab work: anti-TPO Ab, anti-thyroglobulin Ab  - Prednisone trial for possible autoimmune related cerebellar atrophy:    - Take 60mg x 3 days, 50mg x 3 days, 40mg x 3 days, 30mg x 3 days, 20mg x 3 days, 10mg x 3 days, 5mg x 3 day then stop.   - Take over the counter Omeprazole while taking the prednisone. You may stop after the prednisone is complete.   - Journal your symptoms during this time to see if you have a response to prednisone. Test yourself by doing the tandem stand and time how long you can stand unaided. Do this at least once a day.    - Please call the clinic and let Annalise (nurse) know how you are doing after your prednisone taper is completed  - Discussed trial of medical cannabis for treatment of BLE spasticity - will ask our nurse, Annalise, to start the process for this   - After you have completed the prednisone, you may  consider decreasing baclofen to 10 mg three times a day x 1 week then off    - Restart if spasticity worsens   - Restart at 10mg three times a day x 1 week then increase to 20mg three times a day and continue on this thereafter  - Continue to monitor swallowing. Will consider a repeat swallow study and SLP referral if swallowing worsens.     Chanel Castillo, DO  Movement Disorders Fellow    Patient seen and discussed with Dr. Oviedo.   I have personally interviewed and examined Ms. Sheela Davis and agree with diagnosis and management. The total time spent with the patient was 25 minutes, over 50% of the time spent in counseling and coordinating care.      Again, thank you for allowing me to participate in the care of your patient.      Sincerely,    Sandhya Oviedo MD

## 2018-09-14 NOTE — PROGRESS NOTES
Department of Neurology  Movement Disorders Division     Patient: Sheela Davis   MRN: [id number]   : 1965   Date of Visit: 2018     History of Present Illness  Ms. Davis is a 53 year old L handed female with PMH of depression, hepatitis A, Hashimoto thyroiditis that presents to Neurology Movement clinic for follow up regarding management of cerebellar ataxia. Patient was last seen on 2016 where he was continued on baclofen 20mg tid, which she has been on for several years for BLE spasticity. She was also trialed on a prednisone taper for possible autoimmune related cerebellar atrophy. She was also referred to PT, swallow study and a sleep study.     History obtained from patient and accompanied by . Patient reports that she looses her balance every day and may be associated with a fall. She uses a cane with most walks and a walker outside. She last fell a few days ago. She tried to exercise every night. Doesn't recall if prednisone helped, doesn't think so. Baclofen doesn't notice a difference even after skipping for 3 days many years ago. Many days skips her afternoon dose.     She reports that PT helped but doesn't continue exercises after PT is done. Swallow study completed with no issues. Reports choking on liquids more than food when she eats too fast, about once to twice a week or every few weeks. She reports if she eats slow, swallowing is not an issue. Maintains weight.    Sleep study never completed. No issues with sleep currently. Going to see a urologist due to waking up 3 times a night to urinate.    She doesn't drive.     PMH: unchanged  PSH: unchanged  FH: unchanged  SH: unchanged      Review of Systems:  Other than that mentioned above, the remainder of 12 systems reviewed were negative.    Medications:  Current Outpatient Prescriptions   Medication Sig Dispense Refill     baclofen (LIORESAL) 20 MG tablet TAKE ONE TABLET BY MOUTH THREE TIMES DAILY 90 tablet 1     buPROPion  "(WELLBUTRIN XL) 300 MG 24 hr tablet Take 150 mg by mouth 3 times daily        Calcium Citrate-Vitamin D (CALCIUM CITRATE +D PO) Take by mouth daily        escitalopram (LEXAPRO) 20 MG tablet Take 20 mg by mouth daily       levothyroxine (SYNTHROID/LEVOTHROID) 112 MCG tablet TAKE ONE TABLET BY MOUTH ONE TIME DAILY 90 tablet 1     multivitamin, therapeutic with minerals (MULTI-VITAMIN) TABS Take 1 tablet by mouth daily       Naproxen Sodium (ALEVE PO) Take 220 mg by mouth as needed for moderate pain        Physical Exam:  The patient's  height is 1.588 m (5' 2.5\") and weight is 51.3 kg (113 lb). Her oral temperature is 97.4  F (36.3  C). Her blood pressure is 106/68 and her pulse is 81. Her oxygen saturation is 96%.      General: Resting comfortably   Respiratory: No respiratory distress     Neuro Exam:  Mental status: Alert and oriented to person, place, time, and situation. Follows commands  Speech: Fluent, intact comprehension and mild to moderate lingual dysarthria  CN: EOMIB, PERRL, facial sensation intact, facial movement symmetric, hearing grossly intact, tongue protrudes midline   Motor: Moves all extremities equally against gravity without difficulty or abnormalities, increased spasticity in BLE with difficulty relaxing   Difficulty with Luria three step test b/l  Reflexes (R/L): 3/3  in biceps, brachioradialis, triceps, patellars, achilles; no clonus, toes up going  Sensation: intact to light touch throughout   Coordination: FTN and HTN with ataxia R > L, able to stand with feet apart unassisted, able to  tandem b/l for a few seconds then loses balance   Gait: able to rise unassisted from a seated position, decreased/restricted arm swing and decreased length of gait, no en bloc turns, ataxic spastic gait     Impression:  Ms. Davis is a 53 year old L handed female with PMH of depression, hepatitis A, Hashimoto thyroiditis that presents to Neurology Movement clinic for follow up regarding management " of cerebellar ataxia.    1. Cerebellar ataxia: Symptoms started in 1988 and presented with slurred speech with gradual progression of gait difficulty that began in 1996. Patient has had a sufficient genetic test for SCA (SCA1, 23, 6, 7, 8, 10, 14, 17, DRPLA) and SPG, which were all negative. Her SCAR8 variant has low likelihood of causing her symptoms based on current knowledge. Past tests for anti-thyroglobulin Ab (150) and anti-TPO Ab (640) were both elevated. We will trial prednisone for  possible autoimmune related cerebellar atrophy.   2. BLE spasticity: Secondary to #1. She has been taking baclofen 20 mg TID for several years.  3. Dysphagia to liquids: Currently not an issue if she drinks slowly.   4. Hashimoto thyroiditis, w/ positive TPO: follows with endocrinology     Plan:   - Lab work: anti-TPO Ab, anti-thyroglobulin Ab  - Prednisone trial for possible autoimmune related cerebellar atrophy:    - Take 60mg x 3 days, 50mg x 3 days, 40mg x 3 days, 30mg x 3 days, 20mg x 3 days, 10mg x 3 days, 5mg x 3 day then stop.   - Take over the counter Omeprazole while taking the prednisone. You may stop after the prednisone is complete.   - Journal your symptoms during this time to see if you have a response to prednisone. Test yourself by doing the tandem stand and time how long you can stand unaided. Do this at least once a day.    - Please call the clinic and let Annalise (nurse) know how you are doing after your prednisone taper is completed  - Discussed trial of medical cannabis for treatment of BLE spasticity - will ask our nurse, Annalise, to start the process for this   - After you have completed the prednisone, you may consider decreasing baclofen to 10 mg three times a day x 1 week then off    - Restart if spasticity worsens   - Restart at 10mg three times a day x 1 week then increase to 20mg three times a day and continue on this thereafter  - Continue to monitor swallowing. Will consider a repeat swallow study and  SLP referral if swallowing worsens.     Chanel Castillo, DO  Movement Disorders Fellow    Patient seen and discussed with Dr. Oviedo.   I have personally interviewed and examined Ms. Sheela HARDY Ryan and agree with diagnosis and management. The total time spent with the patient was 25 minutes, over 50% of the time spent in counseling and coordinating care.

## 2018-09-14 NOTE — MR AVS SNAPSHOT
After Visit Summary   2018    Sheela Davis    MRN: [id number]           Patient Information     Date Of Birth          1965        Visit Information        Provider Department      2018 10:00 AM [facility] Neurology        Today's Diagnoses     Ataxia    -  1    Spasticity           Follow-ups after your visit        Your next 10 appointments already scheduled     2019 11:30 AM CST   Return Visit with Megan Herron MD   [facility]    [address] [place] [id number] [phone]              Future tests that were ordered for you today     Open Future Orders        Priority Expected Expires Ordered    Thyroglobulin and antibody Routine  2019            Who to contact     Please call your clinic at [phone] to:    Ask questions about your health    Make or cancel appointments    Discuss your medicines    Learn about your test results    Speak to your doctor            Additional Information About Your Visit        MyChart Information     [employer] is an electronic gateway that provides easy, online access to your medical records. With [employer], you can request a clinic appointment, read your test results, renew a prescription or communicate with your care team.     To sign up for [employer] visit the website at [email or web address]   You will be asked to enter the access code listed below, as well as some personal information. Please follow the directions to create your username and password.     Your access code is: [email or web address]  Expires: 2018  6:31 AM     Your access code will  in 90 days. If you need help or a new code, please contact your [facility] or call [phone] for assistance.        Care EveryWhere ID     This is your Care EveryWhere ID. This could be used by other organizations to access your [facility]  records  [id number]         Blood Pressure from Last 3 Encounters:   09/14/18 106/68   01/30/18 126/75   10/18/16 103/60    Weight from Last 3 Encounters:   09/14/18 51.3 kg (113 lb)   01/30/18 50.4 kg (111 lb 3 oz)   10/18/16 53 kg (116 lb 13.5 oz)              Today, you had the following     No orders found for display         Today's Medication Changes          These changes are accurate as of 9/14/18 10:35 AM.  If you have any questions, ask your nurse or doctor.               Start taking these medicines.        Dose/Directions    omeprazole 10 MG CR capsule   Commonly known as:  priLOSEC   Used for:  Cerebellar ataxia (H)   Started by:  Sandhya Oviedo MD        Dose:  20 mg   Take 2 capsules (20 mg) by mouth daily Take by mouth 30-60 minutes before a meal.   Quantity:  30 capsule   Refills:  0       PredniSONE 10 MG (48) Tbpk   Used for:  Spasticity, Cerebellar ataxia (H), Ataxia   Started by:  Sandhya Oviedo MD        Dose:  10 mg   Take 10 mg by mouth See Admin Instructions Take 60mg x 3 days, 50mg x 3d, 40mg x 3d, 30mg x 3d, 20mg x 3d, 10mg x 3d, 5mg x 3d then stop.   Quantity:  60 each   Refills:  0         These medicines have changed or have updated prescriptions.        Dose/Directions    baclofen 20 MG tablet   Commonly known as:  LIORESAL   This may have changed:  See the new instructions.   Used for:  Ataxia   Changed by:  Sandhya Oviedo MD        Dose:  20 mg   Take 1 tablet (20 mg) by mouth 3 times daily   Quantity:  90 tablet   Refills:  3            Where to get your medicines      These medications were sent to [id number] - [place], [place] - [address] [facility]  [address] [address]     Phone:  [phone]     baclofen 20 MG tablet    omeprazole 10 MG CR capsule    PredniSONE 10 MG (48) Tbpk                Primary Care Provider Office Phone # Fax #    Alfredo Marrero [phone]       [address]  [place] [address]        Equal Access to Services     [facility] CenterHAYLEY : Hadii aad ku hadbridgetto Socandyali, waaxda luqadaha, qaybta kaalmada lylybebetohannah, [address] rosalbasandra desouzaandres kierrashankarkhai glaser. So [facility] [phone].    ATENCIÓN: Si habla español, tiene a shelton disposición servicios gratuitos de asistencia lingüística. [facility]Sum[facility] [phone].    We comply with applicable federal civil rights laws and [place] laws. We do not discriminate on the basis of race, color, national origin, age, disability, sex, sexual orientation, or gender identity.            Thank you!     Thank you for choosing [facility] NEUROLOGY  for your care. Our goal is always to provide you with excellent care. Hearing back from our patients is one way we can continue to improve our services. Please take a few minutes to complete the written survey that you may receive in the mail after your visit with us. Thank you!             Your Updated Medication List - Protect others around you: Learn how to safely use, store and throw away your medicines at www.disposemymeds.org.          This list is accurate as of 9/14/18 10:35 AM.  Always use your most recent med list.                   Brand Name Dispense Instructions for use Diagnosis    ALEVE PO      Take 220 mg by mouth as needed for moderate pain        baclofen 20 MG tablet    LIORESAL    90 tablet    Take 1 tablet (20 mg) by mouth 3 times daily    Ataxia       buPROPion 300 MG 24 hr tablet    WELLBUTRIN XL     Take 150 mg by mouth 3 times daily    Cerebellar ataxia (H)       CALCIUM CITRATE +D PO      Take by mouth daily    Cerebellar ataxia (H)       escitalopram 20 MG tablet    LEXAPRO     Take 20 mg by mouth daily        levothyroxine 112 MCG tablet    SYNTHROID/LEVOTHROID    90 tablet    TAKE ONE TABLET BY MOUTH ONE TIME DAILY    Hashimoto's thyroiditis       Multi-vitamin Tabs tablet      Take 1 tablet by mouth daily    Ataxia       omeprazole 10 MG CR capsule    priLOSEC    30 capsule    Take 2  capsules (20 mg) by mouth daily Take by mouth 30-60 minutes before a meal.    Cerebellar ataxia (H)       PredniSONE 10 MG (48) Tbpk     60 each    Take 10 mg by mouth See Admin Instructions Take 60mg x 3 days, 50mg x 3d, 40mg x 3d, 30mg x 3d, 20mg x 3d, 10mg x 3d, 5mg x 3d then stop.    Spasticity, Cerebellar ataxia (H), Ataxia

## 2018-09-14 NOTE — TELEPHONE ENCOUNTER
"  Additional Information    Pharmacy calling with prescription question and triager answers question     [facility] calling regarding RX PredniSONE 10 MG (48) TBPK 60 each 0 9/14/2018  --  Sig - Route: Take 10 mg by mouth See Admin Instructions Take 60mg x 3 days, 50mg x 3d, 40mg x 3d, 30mg x 3d, 20mg x 3d, 10mg x 3d, 5mg x 3d then stop. - Oral  Class: E-Prescribe  Notes to Pharmacy: Take 60mg x 3 days, 50mg x 3 days, 40mg x 3 days, 30mg x 3 days, 20mg x 3 days, 10mg x 3 days, 5mg x 3 day then stop.    If you do the math taper it's written for 60 tablets , it needs to be for 65. \" Gave verbal ok per [facility] RN protocol.    Protocols used: MEDICATION QUESTION CALL-ADULT-    "

## 2018-09-14 NOTE — MR AVS SNAPSHOT
After Visit Summary   9/14/2018    Sheela Davis    MRN: [id number]           Patient Information     Date Of Birth          1965        Visit Information        Provider Department      9/14/2018 9:00 AM Sandhya Oviedo MD [facility]        Today's Diagnoses     Ataxia    -  1    Spasticity        Cerebellar ataxia (H)          Care Instructions    Plan:   - Lab work: anti-TPO Ab, anti-thyroglobulin Ab  - Prednisone trial:    - Take 60mg x 3 days, 50mg x 3 days, 40mg x 3 days, 30mg x 3 days, 20mg x 3 days, 10mg x 3 days, 5mg x 3 day then stop.   - Take over the counter Omeprazole while taking the prednisone. You may stop after the prednisone is complete.   - Journal your symptoms during this time to see if you have a response to prednisone. Test yourself by doing the tandem stand and time how long you can stand unaided. Do this at least once a day.    - Please call the clinic and let Annalise (nurse) know how you are doing after your prednisone taper is completed  - Discussed trial of medical cannabis for treatment of BLE spasticity   - After you have completed the prednisone, you may consider decreasing baclofen to 10 mg three times a day x 1 week then off    - Restart if spasticity worsens   - Restart at 10mg three times a day x 1 week then increase to 20mg three times a day and continue on this thereafter    We will see you back 3 months.           Follow-ups after your visit        Follow-up notes from your care team     Return in about 3 months (around 12/14/2018).      Your next 10 appointments already scheduled     Sep 14, 2018 11:00 AM CDT   LAB with [facility] ([facility])    [address] [place] [id number] [phone]           Please do not eat 10-12 hours before your appointment if you are coming in fasting for labs on lipids, cholesterol, or glucose (sugar). This does not apply to pregnant women.  Water, hot tea and black coffee (with nothing added) are okay. Do not drink other fluids, diet soda or chew gum.            Dec 14, 2018 11:20 AM CST   (Arrive by 11:05 AM)   Return Ataxia with Sandhya Oviedo MD   [facility] ([facility])    [address]  [address] [facility] [id number] [phone]            2019 11:30 AM CST   Return Visit with Megan Herron MD   [facility] ([facility])    [address] [place] [id number] [phone]              Future tests that were ordered for you today     Open Future Orders        Priority Expected Expires Ordered    Thyroglobulin and antibody Routine  2019            Who to contact     Please call your clinic at [phone] to:    Ask questions about your health    Make or cancel appointments    Discuss your medicines    Learn about your test results    Speak to your doctor            Additional Information About Your Visit        [employer] Information     [employer]) is an electronic gateway that provides easy, online access to your medical records. With [employer]), you can request a clinic appointment, read your test results, renew a prescription or communicate with your care team.     To sign up for [employer] visit the website at [email or web address]   You will be asked to enter the access code listed below, as well as some personal information. Please follow the directions to create your username and password.     Your access code is: [email or web address]  Expires: 2018  6:31 AM     Your access code will  in 90 days. If you need help or a new code, please contact your [facility] or call [phone] for assistance.        Care EveryWhere ID     This is your Care EveryWhere ID. This could be used by other organizations to access your [facility] medical records  [id number]        Your Vitals Were   "   Pulse Temperature Height Pulse Oximetry Breastfeeding? 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  If you have any questions, ask your nurse or doctor.               Start taking these medicines.        Dose/Directions    omeprazole 10 MG CR capsule   Commonly known as:  priLOSEC   Used for:  Cerebellar ataxia (H)   Started by:  Sandhya Oviedo MD        Dose:  20 mg   Take 2 capsules (20 mg) by mouth daily Take by mouth 30-60 minutes before a meal.   Quantity:  30 capsule   Refills:  0       PredniSONE 10 MG (48) Tbpk   Used for:  Spasticity, Cerebellar ataxia (H), Ataxia   Started by:  Sandhya Oviedo MD        Dose:  10 mg   Take 10 mg by mouth See Admin Instructions Take 60mg x 3 days, 50mg x 3d, 40mg x 3d, 30mg x 3d, 20mg x 3d, 10mg x 3d, 5mg x 3d then stop.   Quantity:  60 each   Refills:  0         These medicines have changed or have updated prescriptions.        Dose/Directions    baclofen 20 MG tablet   Commonly known as:  LIORESAL   This may have changed:  See the new instructions.   Used for:  Ataxia   Changed by:  Sandhya Oviedo MD        Dose:  20 mg   Take 1 tablet (20 mg) by mouth 3 times daily   Quantity:  90 tablet   Refills:  3            Where to get your medicines      These medications were sent to [id number] - [facility] [address]  [address] [address]     Phone:  [phone]     baclofen 20 MG tablet    omeprazole 10 MG CR capsule    PredniSONE 10 MG (48) Tbpk                Primary Care Provider Office Phone # Fax #    Alfredo Marrero [phone]       [address] [address]        Equal Access to Services     [facility]: Hadii aad ku hadasho Soomaali, waaxda luqadaha, qaybta kaalmada adeegyada, waxay idiin hayaan adeeg kharash la'aan ah. So [facility] [phone].    ATENCIÓN: Si habla español, tiene a shelton disposición servicios gratuitos de asistencia lingüística. Faisal al [phone].    We comply with applicable federal civil rights laws and [place] laws. We do not discriminate on the basis of race, color, national origin, age, disability, sex, sexual orientation, or gender identity.            Thank you!     Thank you for choosing [facility]  for your care. Our goal is always to provide you with excellent care. Hearing back from our patients is one way we can continue to improve our services. Please take a few minutes to complete the written survey that you may receive in the mail after your visit with us. Thank you!             Your Updated Medication List - Protect others around you: Learn how to safely use, store and throw away your medicines at www.disposemymeds.org.          This list is accurate as of 9/14/18 10:40 AM.  Always use your most recent med list.                   Brand Name Dispense Instructions for use Diagnosis    ALEVE PO      Take 220 mg by mouth as needed for moderate pain        baclofen 20 MG tablet    LIORESAL    90 tablet    Take 1 tablet (20 mg) by mouth 3 times daily    Ataxia       buPROPion 300 MG 24 hr tablet    WELLBUTRIN XL     Take 150 mg by mouth 3 times daily    Cerebellar ataxia (H)       CALCIUM CITRATE +D PO      Take by mouth daily    Cerebellar ataxia (H)       escitalopram 20 MG tablet    LEXAPRO     Take 20 mg by mouth  daily        levothyroxine 112 MCG tablet    SYNTHROID/LEVOTHROID    90 tablet    TAKE ONE TABLET BY MOUTH ONE TIME DAILY    Hashimoto's thyroiditis       Multi-vitamin Tabs tablet      Take 1 tablet by mouth daily    Ataxia       omeprazole 10 MG CR capsule    priLOSEC    30 capsule    Take 2 capsules (20 mg) by mouth daily Take by mouth 30-60 minutes before a meal.    Cerebellar ataxia (H)       PredniSONE 10 MG (48) Tbpk     60 each    Take 10 mg by mouth See Admin Instructions Take 60mg x 3 days, 50mg x 3d, 40mg x 3d, 30mg x 3d, 20mg x 3d, 10mg x 3d, 5mg x 3d then stop.    Spasticity, Cerebellar ataxia (H), Ataxia

## 2018-09-15 ASSESSMENT — PATIENT HEALTH QUESTIONNAIRE - PHQ9: SUM OF ALL RESPONSES TO PHQ QUESTIONS 1-9: 5

## 2018-09-18 LAB — COPATH REPORT: NORMAL

## 2018-10-01 LAB — LAB SCANNED RESULT: ABNORMAL

## 2018-10-06 ENCOUNTER — HEALTH MAINTENANCE LETTER (OUTPATIENT)
Age: 53
End: 2018-10-06

## 2018-10-15 DIAGNOSIS — E06.3 HASHIMOTO'S THYROIDITIS: ICD-10-CM

## 2018-10-15 RX ORDER — LEVOTHYROXINE SODIUM 112 UG/1
TABLET ORAL
Qty: 90 TABLET | Refills: 1 | Status: SHIPPED | OUTPATIENT
Start: 2018-10-15 | End: 2019-04-23

## 2018-10-26 ENCOUNTER — TELEPHONE (OUTPATIENT)
Dept: NEUROLOGY | Facility: CLINIC | Age: 53
End: 2018-10-26

## 2018-10-26 NOTE — TELEPHONE ENCOUNTER
GENETIC COUNSELING-  I left a message for Sheela that insurance had denied genetic testing prior authorization.  I indicated that we would not be doing testing unless she called back to indicate otherwise.    Jorge Leggett MS, [facility]  Licensed Genetic Counselor

## 2018-11-05 ENCOUNTER — TELEPHONE (OUTPATIENT)
Dept: NEUROLOGY | Facility: CLINIC | Age: 53
End: 2018-11-05

## 2018-11-05 NOTE — TELEPHONE ENCOUNTER
[facility] Call Center    Phone Message    May a detailed message be left on voicemail: yes    Reason for Call: Other: Pt returning call. Please call her back.     Action Taken: Message routed to:  [facility] ([facility] neurology

## 2018-11-06 NOTE — TELEPHONE ENCOUNTER
[facility] Call Center    Phone Message    May a detailed message be left on voicemail: yes    Reason for Call: Other: Per call from PT is returning calls to [facility]     Action Taken: Message routed to:  [facility] ([facility] Neurology

## 2018-11-06 NOTE — TELEPHONE ENCOUNTER
[facility] Call Center    Phone Message    May a detailed message be left on voicemail: yes    Reason for Call: Other: Pt called in to [facility] lab line asking for Annalise--writer informed pt that msg would be sent to neurology clinic for f/u.      Action Taken: Message routed to:  [facility] ([facility]): Neurology     Called pt and she said she can not tell any difference from taking the taper dose of prednisone. She is applying for medical marijuana but because the application has to be done on line it is taking her a long time, she is going to see if her daughter can help her get it completed. She is taking 20mg of baclofen per day. Her appointment for Dec. 14 was cancelled, she will keep this writer informed when she starts taking the medical marijuana.

## 2019-01-08 DIAGNOSIS — R27.0 ATAXIA: ICD-10-CM

## 2019-01-08 RX ORDER — BACLOFEN 20 MG/1
20 TABLET ORAL 3 TIMES DAILY
Qty: 90 TABLET | Refills: 2 | Status: SHIPPED | OUTPATIENT
Start: 2019-01-08

## 2019-03-22 ENCOUNTER — TELEPHONE (OUTPATIENT)
Dept: NEUROLOGY | Facility: CLINIC | Age: 54
End: 2019-03-22

## 2019-03-22 NOTE — TELEPHONE ENCOUNTER
Pt called and said she had a seizure on May 4th.She said she followed up with her primary who told her she was suppose to see a neurologist. She called and wanted to make an appointment with Dr. Oviedo for her seizures. Pt was assisted in finding a doctor in her area to see for her epilepsy. She was given the phone number of Dr. Polanco and will call to get scheduled.

## 2019-04-08 ENCOUNTER — OFFICE VISIT (OUTPATIENT)
Dept: NEUROLOGY | Facility: CLINIC | Age: 54
End: 2019-04-08
Payer: MEDICARE

## 2019-04-08 VITALS
WEIGHT: 107.2 LBS | DIASTOLIC BLOOD PRESSURE: 70 MMHG | RESPIRATION RATE: 16 BRPM | BODY MASS INDEX: 19.29 KG/M2 | SYSTOLIC BLOOD PRESSURE: 118 MMHG

## 2019-04-08 DIAGNOSIS — R40.20 LOSS OF CONSCIOUSNESS (H): Primary | ICD-10-CM

## 2019-04-08 RX ORDER — DIVALPROEX SODIUM 500 MG/1
TABLET, EXTENDED RELEASE ORAL
COMMUNITY
Start: 2019-02-08 | End: 2019-06-18

## 2019-04-08 RX ORDER — CHLORAL HYDRATE 500 MG
2 CAPSULE ORAL DAILY
COMMUNITY

## 2019-04-08 ASSESSMENT — PAIN SCALES - GENERAL: PAINLEVEL: NO PAIN (0)

## 2019-04-08 NOTE — PROGRESS NOTES
"Service Date: 04/08/2019      INTRODUCTION:  The patient is a 54-year-old woman with a history of cerebellar ataxia (following with Dr. Oviedo) who presented for evaluation of an episode of loss of consciousness concerning for seizures.  The patient is alone in this visit.      HISTORY OF PRESENT ILLNESS:  The patient had an episode of loss of consciousness on 02/04/2019.  She states she was very stressed when she went to bed.  She had lost her job.  Her neurological status deteriorated and had a recent family fight.  She is not sure if she took too much medication.  She went to bed not wanting to see or talk to anyone and not wanting to face another day.  Her daughter came home and saw her in the bed doing something unusual with her hands up in the air, but she does not recall that.  Her daughter called 911, and she was taken to the hospital.  She was intubated and was placed on Keppra. EEG was inconclusive as was read as \"The patient was on propofol, and there was suppression of activities and diffuse slowing; however, there were no epileptiform discharges.\"  Brain MRI on 02/04/2019 showed no acute intracranial abnormality, asymmetric atrophy of the cerebellum, less parenchymal loss in the cerebral hemisphere, patent intracranial circulation, patent carotid and vertebral arteries.  Due to the patient's history of depression and anxiety, Keppra was switched to Depakote.  She is on 500 mg twice a day.  The patient was extubated.  She does not recall anything during the 3 days of hospitalization.  She has been continued on Depakote since then.  She did not have any similar episodes in the past or since the first episode.  She denies a history of childhood seizures, febrile seizures, meningitis, encephalitis, family history of epilepsy or significant head trauma.        ANTIEPILEPTIC MEDICATIONS:  Depakote 500 mg b.i.d.      Current Outpatient Medications   Medication Sig Dispense Refill     baclofen (LIORESAL) 20 MG " tablet Take 1 tablet (20 mg) by mouth 3 times daily 90 tablet 2     Calcium Citrate-Vitamin D (CALCIUM CITRATE +D PO) Take by mouth daily        divalproex sodium extended-release (DEPAKOTE ER) 500 MG 24 hr tablet Take 500 mg by mouth twice daily       escitalopram (LEXAPRO) 20 MG tablet Take 20 mg by mouth daily       fish oil-omega-3 fatty acids 1000 MG capsule Take 2 g by mouth daily       levothyroxine (SYNTHROID/LEVOTHROID) 112 MCG tablet TAKE ONE TABLET BY MOUTH ONE TIME DAILY 90 tablet 1     multivitamin, therapeutic with minerals (MULTI-VITAMIN) TABS Take 1 tablet by mouth daily       Naproxen Sodium (ALEVE PO) Take 220 mg by mouth as needed for moderate pain       buPROPion (WELLBUTRIN XL) 300 MG 24 hr tablet Take 150 mg by mouth 3 times daily        omeprazole (PRILOSEC) 10 MG CR capsule Take 2 capsules (20 mg) by mouth daily Take by mouth 30-60 minutes before a meal. (Patient not taking: Reported on 4/8/2019) 30 capsule 0     PredniSONE 10 MG (48) TBPK Take 10 mg by mouth See Admin Instructions Take 60mg x 3 days, 50mg x 3d, 40mg x 3d, 30mg x 3d, 20mg x 3d, 10mg x 3d, 5mg x 3d then stop. (Patient not taking: Reported on 4/8/2019) 60 each 0     PAST MEDICAL HISTORY:   1.  Spinocerebellar degeneration.   2.  Depression.   3.  Hashimoto thyroiditis.      SOCIAL/EDUCATIONAL HISTORY:  The patient denies a history of physical or sexual abuse.  She received her GED.  She worked as a  for 22 years; now she is disabled.  She is  and has 3 children.  She denies drinking, smoking or illicit drugs.      REVIEW OF SYSTEMS:  Complete review of system was done and was positive for loss of appetite, urinary incontinence, urinary frequency, muscle weakness, headaches, memory loss, confusion, loss of balance and weakness.      PHYSICAL EXAMINATION:   /70   Resp 16   Wt 107 lb 3.2 oz (48.6 kg)   BMI 19.29 kg/m    GENERAL APPEARANCE:  Alert, awake, cooperative and pleasant, in no apparent distress.       NEUROLOGICAL EXAMINATION:   MENTAL STATUS:  Alert and oriented.   LANGUAGE/SPEECH:  No aphasia or dysarthria.   CRANIAL NERVES:  Pupils are round and reactive to light.  Extraocular movements are intact.  Facial sensation is intact to light touch.  Face is symmetric.  Tongue is midline.  Shrug shoulder is normal.   MOTOR:  Slight increased tone in lower extremities.  Normal bulk and strength 5/5 bilaterally.   SENSATION: intact to light touch.  DTRs 1+ and symmetric in upper extremities, hyperreflexia in bilateral lower extremities.   COORDINATION:  Slow finger to nose with dysmetria.     GAIT: Spastic wide-based gait and slow.  Tandem gait is not performed.      ASSESSMENT:  A 54-year-old female with a history of spinocerebellar degeneration with an episode of loss of consciousness, questionable seizure in the setting of increased stress and possible medication overdose.  EEG did not show epileptiform activity; however, it was done under sedation while the patient was intubated in the hospital.  The patient has no known risk factors for epilepsy, and that was the only episode of loss of consciousness she had in her life.  I discussed doing an EEG.  If the EEG is abnormal and shows that she has a tendency for having more seizures, then she needs to continue Depakote.  If the EEG does not show signs of tendency for seizure, she may taper her Depakote since her risk of having more seizures would be very low.  However, I discussed that I cannot guarantee that she would not have anymore seizures, and Depakote is also a mood stabilizer, and it may help with her mood if she wants to continue.  We will discuss further about continuing medication after the EEG.     [place] regulations on seizures and driving were reviewed with the patient.  The patient clearly understands that she/he is prohibited from operating a motor vehicle within 3 months following any seizure (that will impair control of car) or other episode with  sudden unconsciousness or inability to sit up, and that she/he is required to report this most recent seizure to the DMV within 30 days after the event.    Avoid any activities that might lead to self-injury or injury of others, within 3 months following any seizure with impaired awareness or impaired motor control such activities include but are not limited to operating power tools, operating firearms, climbing ladders/trees/exposure to heights from which he might fall, exposure to vessels with hot cooking oil or water, and swimming alone.     PLAN:   1.  A 3 hour video EEG.   2.  Follow up after video EEG.         MARIELA BRISENO MD             D: 2019   T: 2019   MT: greyson      Name:     RICCI KLINE   MRN:      [id number]        Account:      [id number]   :      1965           Service Date: 2019      Document: [id number]

## 2019-04-08 NOTE — LETTER
"2019       RE: Sheela Davis  : 1965   MRN: [id number]      Dear Colleague,    Thank you for referring your patient, Sheela Davis, to the [facility] EPILEPSY CARE at [facility]. Please see a copy of my visit note below.    Service Date: 2019      INTRODUCTION:  The patient is a 54-year-old woman with a history of cerebellar ataxia (following with Dr. Oviedo) who presented for evaluation of an episode of loss of consciousness concerning for seizures.  The patient is alone in this visit.      HISTORY OF PRESENT ILLNESS:  The patient had an episode of loss of consciousness on 2019.  She states she was very stressed when she went to bed.  She had lost her job.  Her neurological status deteriorated and had a recent family fight.  She is not sure if she took too much medication.  She went to bed not wanting to see or talk to anyone and not wanting to face another day.  Her daughter came home and saw her in the bed doing something unusual with her hands up in the air, but she does not recall that.  Her daughter called 911, and she was taken to the hospital.  She was intubated and was placed on Keppra. EEG was inconclusive as was read as \"The patient was on propofol, and there was suppression of activities and diffuse slowing; however, there were no epileptiform discharges.\"  Brain MRI on 2019 showed no acute intracranial abnormality, asymmetric atrophy of the cerebellum, less parenchymal loss in the cerebral hemisphere, patent intracranial circulation, patent carotid and vertebral arteries.  Due to the patient's history of depression and anxiety, Keppra was switched to Depakote.  She is on 500 mg twice a day.  The patient was extubated.  She does not recall anything during the 3 days of hospitalization.  She has been continued on Depakote since then.  She did not have any similar episodes in the past or since the first episode.  She denies a history of " childhood seizures, febrile seizures, meningitis, encephalitis, family history of epilepsy or significant head trauma.        ANTIEPILEPTIC MEDICATIONS:  Depakote 500 mg b.i.d.      Current Outpatient Medications   Medication Sig Dispense Refill     baclofen (LIORESAL) 20 MG tablet Take 1 tablet (20 mg) by mouth 3 times daily 90 tablet 2     Calcium Citrate-Vitamin D (CALCIUM CITRATE +D PO) Take by mouth daily        divalproex sodium extended-release (DEPAKOTE ER) 500 MG 24 hr tablet Take 500 mg by mouth twice daily       escitalopram (LEXAPRO) 20 MG tablet Take 20 mg by mouth daily       fish oil-omega-3 fatty acids 1000 MG capsule Take 2 g by mouth daily       levothyroxine (SYNTHROID/LEVOTHROID) 112 MCG tablet TAKE ONE TABLET BY MOUTH ONE TIME DAILY 90 tablet 1     multivitamin, therapeutic with minerals (MULTI-VITAMIN) TABS Take 1 tablet by mouth daily       Naproxen Sodium (ALEVE PO) Take 220 mg by mouth as needed for moderate pain       buPROPion (WELLBUTRIN XL) 300 MG 24 hr tablet Take 150 mg by mouth 3 times daily        omeprazole (PRILOSEC) 10 MG CR capsule Take 2 capsules (20 mg) by mouth daily Take by mouth 30-60 minutes before a meal. (Patient not taking: Reported on 4/8/2019) 30 capsule 0     PredniSONE 10 MG (48) TBPK Take 10 mg by mouth See Admin Instructions Take 60mg x 3 days, 50mg x 3d, 40mg x 3d, 30mg x 3d, 20mg x 3d, 10mg x 3d, 5mg x 3d then stop. (Patient not taking: Reported on 4/8/2019) 60 each 0     PAST MEDICAL HISTORY:   1.  Spinocerebellar degeneration.   2.  Depression.   3.  Hashimoto thyroiditis.      SOCIAL/EDUCATIONAL HISTORY:  The patient denies a history of physical or sexual abuse.  She received her GED.  She worked as a  for 22 years; now she is disabled.  She is  and has 3 children.  She denies drinking, smoking or illicit drugs.      REVIEW OF SYSTEMS:  Complete review of system was done and was positive for loss of appetite, urinary incontinence, urinary  frequency, muscle weakness, headaches, memory loss, confusion, loss of balance and weakness.      PHYSICAL EXAMINATION:   /70   Resp 16   Wt 107 lb 3.2 oz (48.6 kg)   BMI 19.29 kg/m     GENERAL APPEARANCE:  Alert, awake, cooperative and pleasant, in no apparent distress.      NEUROLOGICAL EXAMINATION:   MENTAL STATUS:  Alert and oriented.   LANGUAGE/SPEECH:  No aphasia or dysarthria.   CRANIAL NERVES:  Pupils are round and reactive to light.  Extraocular movements are intact.  Facial sensation is intact to light touch.  Face is symmetric.  Tongue is midline.  Shrug shoulder is normal.   MOTOR:  Slight increased tone in lower extremities.  Normal bulk and strength 5/5 bilaterally.   SENSATION: intact to light touch.  DTRs 1+ and symmetric in upper extremities, hyperreflexia in bilateral lower extremities.   COORDINATION:  Slow finger to nose with dysmetria.     GAIT: Spastic wide-based gait and slow.  Tandem gait is not performed.      ASSESSMENT:  A 54-year-old female with a history of spinocerebellar degeneration with an episode of loss of consciousness, questionable seizure in the setting of increased stress and possible medication overdose.  EEG did not show epileptiform activity; however, it was done under sedation while the patient was intubated in the hospital.  The patient has no known risk factors for epilepsy, and that was the only episode of loss of consciousness she had in her life.  I discussed doing an EEG.  If the EEG is abnormal and shows that she has a tendency for having more seizures, then she needs to continue Depakote.  If the EEG does not show signs of tendency for seizure, she may taper her Depakote since her risk of having more seizures would be very low.  However, I discussed that I cannot guarantee that she would not have anymore seizures, and Depakote is also a mood stabilizer, and it may help with her mood if she wants to continue.  We will discuss further about continuing  medication after the EEG.     [place] regulations on seizures and driving were reviewed with the patient.  The patient clearly understands that she/he is prohibited from operating a motor vehicle within 3 months following any seizure (that will impair control of car) or other episode with sudden unconsciousness or inability to sit up, and that she/he is required to report this most recent seizure to the DMV within 30 days after the event.    Avoid any activities that might lead to self-injury or injury of others, within 3 months following any seizure with impaired awareness or impaired motor control such activities include but are not limited to operating power tools, operating firearms, climbing ladders/trees/exposure to heights from which he might fall, exposure to vessels with hot cooking oil or water, and swimming alone.     PLAN:   1.  A 3 hour video EEG.   2.  Follow up after video EEG.         ALICE BRISENO MD             D: 2019   T: 2019   MT: [email or web address]      Name:     RICCI KLINE   MRN:      [id number]        Account:      [id number]   :      1965           Service Date: 2019      Document: [id number]        Again, thank you for allowing me to participate in the care of your patient.      Sincerely,    Alice Briseno MD

## 2019-04-15 ENCOUNTER — ALLIED HEALTH/NURSE VISIT (OUTPATIENT)
Dept: NEUROLOGY | Facility: CLINIC | Age: 54
End: 2019-04-15
Attending: PSYCHIATRY & NEUROLOGY
Payer: MEDICARE

## 2019-04-15 DIAGNOSIS — R40.20 LOSS OF CONSCIOUSNESS (H): ICD-10-CM

## 2019-04-15 NOTE — PROCEDURES
CPT 53749-16  [facility]3hr [facility]  [facility]

## 2019-04-23 DIAGNOSIS — E06.3 HASHIMOTO'S THYROIDITIS: ICD-10-CM

## 2019-04-23 RX ORDER — LEVOTHYROXINE SODIUM 112 UG/1
TABLET ORAL
Qty: 60 TABLET | Refills: 0 | Status: SHIPPED | OUTPATIENT
Start: 2019-04-23 | End: 2019-05-21

## 2019-04-23 NOTE — TELEPHONE ENCOUNTER
Follow up 5/21/19.    Refill approved.    Nilsa Lim LPN  Diabetes Clinic Coordinator   [facility]  [facility]

## 2019-04-24 ENCOUNTER — TELEPHONE (OUTPATIENT)
Dept: CARDIOLOGY | Facility: CLINIC | Age: 54
End: 2019-04-24

## 2019-04-25 NOTE — PROCEDURES
Procedure Date: 04/15/2019      SOURCE FILE DURATION:  3 hours 11 seconds.     EEG #[id number]     CLINICAL SUMMARY:  The patient is a 54-year-old woman with history of a spinocerebellar ataxia, who presented for evaluation of an episode of loss of consciousness.  Video EEG was performed to evaluate for seizures.  The patient is currently on Depakote.     TECHNICAL SUMMARY: This continuous video- EEG monitoring procedure was performed with 23 scalp electrodes in 10-20 electrode system placement, and additional scalp, precordial and other surface electrodes used for electrical referencing and artifact detection.  Video monitoring was utilized and periodically reviewed by EEG technologists and the physician for electroclinical correlations.     INTERICTAL ACTIVITY:  During maximal wakefulness, there was 9 Hz alpha activity over the posterior head regions which was symmetric and attenuated by eye opening.  Drowsiness was manifested as dropout of the posterior dominant rhythm and diffuse theta activity and horizontal eye movements.  Stage II sleep was manifested as vertex waves, symmetric sleep spindles and K complexes.  Focal theta-delta slowing was present over the left temporal head region, maximal during sleep.  There were also sharply contoured activities in this region.  During sleep, occasional sharp waves were seen over the left temporal head region, maximal at F7/T3.  A good example is at 10:32:54.      ACTIVATION MANEUVERS:  Photic stimulation and hyperventilation did not induce an abnormal activity on the EEG.      CLINICAL AND ICTAL EVENTS:  No electrographic or clinical seizures were recorded.      IMPRESSION:  This is an abnormal video EEG due to the presence of focal slowing and occasional epileptiform discharges over the left frontotemporal head region during sleep, consistent with a structural abnormality and focal cortical irritability there and tendency for seizures. No seizures were recorded during  this monitoring.         MARIELA BRISENO MD             D: 2019   T: 2019   MT: JADA      Name:     RICCI KLINE   MRN:      [id number]        Account:        [id number]   :      1965           Procedure Date: 04/15/2019      Document: [id number]

## 2019-05-16 ENCOUNTER — VIRTUAL VISIT (OUTPATIENT)
Dept: NEUROLOGY | Facility: CLINIC | Age: 54
End: 2019-05-16
Payer: MEDICARE

## 2019-05-16 DIAGNOSIS — G40.109 FOCAL EPILEPSY (H): Primary | ICD-10-CM

## 2019-05-21 ENCOUNTER — OFFICE VISIT (OUTPATIENT)
Dept: ENDOCRINOLOGY | Facility: CLINIC | Age: 54
End: 2019-05-21
Payer: MEDICARE

## 2019-05-21 VITALS — SYSTOLIC BLOOD PRESSURE: 105 MMHG | OXYGEN SATURATION: 100 % | HEART RATE: 61 BPM | DIASTOLIC BLOOD PRESSURE: 66 MMHG

## 2019-05-21 DIAGNOSIS — E06.3 HASHIMOTO'S THYROIDITIS: ICD-10-CM

## 2019-05-21 LAB
T4 FREE SERPL-MCNC: 1.23 NG/DL (ref 0.76–1.46)
TSH SERPL DL<=0.005 MIU/L-ACNC: 0.62 MU/L (ref 0.4–4)

## 2019-05-21 PROCEDURE — 84443 ASSAY THYROID STIM HORMONE: CPT | Performed by: INTERNAL MEDICINE

## 2019-05-21 PROCEDURE — 99214 OFFICE O/P EST MOD 30 MIN: CPT | Performed by: INTERNAL MEDICINE

## 2019-05-21 PROCEDURE — 84439 ASSAY OF FREE THYROXINE: CPT | Performed by: INTERNAL MEDICINE

## 2019-05-21 PROCEDURE — 36415 COLL VENOUS BLD VENIPUNCTURE: CPT | Performed by: INTERNAL MEDICINE

## 2019-05-21 RX ORDER — LEVOTHYROXINE SODIUM 112 UG/1
112 TABLET ORAL DAILY
Qty: 90 TABLET | Refills: 3 | Status: SHIPPED | OUTPATIENT
Start: 2019-05-21 | End: 2020-05-13 | Stop reason: DRUGHIGH

## 2019-05-21 NOTE — LETTER
5/21/2019         RE: Sheela Davis  [address] [place] [address]        Dear Colleague,    Thank you for referring your patient, Sheela Davis, to the [facility]. Please see a copy of my visit note below.      The patient is seen in f/up for evaluation of hypothyroidism.     Sheela Davis is a 54 year old female with a past medical history of cerebellar ataxia and a family history of hypothyroidism, who was diagnosed with Hashimoto thyroiditis in August 2013. The thyroid ultrasound done in October 2013 revealed a heterogeneous, normal size thyroid gland.     The dose of levothyroxine was progressively increased to a maximum dose of 150 mcg daily, in 2015. Since 2015, the dose was gradually decreased to the current dose of 112 mcg daily, which the patient has been taking since 2016.     The patient reports being compliant in taking the levothyroxine on an empty stomach, a couple of hrs prior to breakfast, separate from multivitamins and calcium supplements.     The ataxia is progressively getting worse. She continues to fall ~ 3 times a week. Denies fractures. She reports taking calcium, 600 mg daily, and vitamin D supplements.     Past Medical History   Diagnosis Date     Headache      Depression      Urinary incontinence      Bowel obstruction surgeries - last 4/2014       Hepatitis A 1968     age 3   Cerebellar ataxia   Hashimoto thyroiditis  Raynaud syndrome  Seizure 2/2019     Past Surgical History:   Procedure Laterality Date     bowel obstruction surgery  2008 and 2009      SLING TRANSVAGINAL       SMALL BOWEL RESECTION  Jan/2013     thumb reconstruction     Partial hysterectomy in 1995     Current Medications  2000 units vitamin D daily    Current Outpatient Medications:      baclofen (LIORESAL) 20 MG tablet, Take 1 tablet (20 mg) by mouth 3 times daily, Disp: 90 tablet, Rfl: 2     buPROPion (WELLBUTRIN XL) 300 MG 24 hr tablet, Take 150 mg by mouth 3 times  daily , Disp: , Rfl:      Calcium Citrate-Vitamin D (CALCIUM CITRATE +D PO), Take by mouth daily , Disp: , Rfl:      divalproex sodium extended-release (DEPAKOTE ER) 500 MG 24 hr tablet, Take 500 mg by mouth twice daily, Disp: , Rfl:      escitalopram (LEXAPRO) 20 MG tablet, Take 20 mg by mouth daily, Disp: , Rfl:      fish oil-omega-3 fatty acids 1000 MG capsule, Take 2 g by mouth daily, Disp: , Rfl:      levothyroxine (SYNTHROID/LEVOTHROID) 112 MCG tablet, TAKE ONE TABLET BY MOUTH ONE TIME DAILY, Disp: 60 tablet, Rfl: 0     multivitamin, therapeutic with minerals (MULTI-VITAMIN) TABS, Take 1 tablet by mouth daily, Disp: , Rfl:      Naproxen Sodium (ALEVE PO), Take 220 mg by mouth as needed for moderate pain, Disp: , Rfl:      omeprazole (PRILOSEC) 10 MG CR capsule, Take 2 capsules (20 mg) by mouth daily Take by mouth 30-60 minutes before a meal. (Patient not taking: Reported on 4/8/2019), Disp: 30 capsule, Rfl: 0     PredniSONE 10 MG (48) TBPK, Take 10 mg by mouth See Admin Instructions Take 60mg x 3 days, 50mg x 3d, 40mg x 3d, 30mg x 3d, 20mg x 3d, 10mg x 3d, 5mg x 3d then stop. (Patient not taking: Reported on 4/8/2019), Disp: 60 each, Rfl: 0    Family History   Problem Relation Age of Onset     Hypothyroidism  Sisters      Psychiatry Son      ADHD     Neurological Daughter      migraines     Cardiovascular Mother      CHF     Diabetes in his 30's - type 1  Father      Diabetes dx in her late 50s - tx with insulin  Mother    2 sisters dx with insulin dependent diabetes. Many family members are overweight.     Social History   with 3 children. She denies smoking, drinking alcohol or using illicit drugs. Occupation: retired .    Review of Systems   Systemic:             Weight stable  Eye:                      No eye symptoms   Carlito-Laryngeal:     Dysphagia for both solid and liquid foods on and off for the last 20 years - stable, no hoarseness, no cough     Breast:                  No breast  symptoms  Cardiovascular:    No cardiovascular symptoms, no CP or palpitations   Pulmonary:           No pulmonary symptoms, no SOB or cough    Gastrointestinal:   No N/V, no diarrhea or constipation; some fecal incontinence    Genitourinary:       Urinary incontinence, no increased thirst  Endocrine:            cold intolerance - all her life; no hot flashes   Neurological:        headaches - 1 time a week, no tremor, no numbness or tingling sensation, episodes of lightheadedness - present intermittently, sometimes lasting for days; they have been present since her teen years; h/o falls due to diff maintaining her balance  Musculoskeletal:  No joint pain; muscle pain in her legs; the left sternoclavicular joint prominence has been stable  Skin:                     No skin symptoms, no dry skin, no hair falling out   Psychological:     Depression - controlled, not suicidal               Vital Signs     Previous Weights:    Wt Readings from Last 10 Encounters:   04/08/19 48.6 kg (107 lb 3.2 oz)   09/14/18 51.3 kg (113 lb)   01/30/18 50.4 kg (111 lb 3 oz)   10/18/16 53 kg (116 lb 13.5 oz)   05/20/16 49.8 kg (109 lb 12.8 oz)   10/13/15 51.8 kg (114 lb 4 oz)   10/14/14 51 kg (112 lb 8 oz)   04/11/14 48.1 kg (106 lb)   10/22/13 49.2 kg (108 lb 8 oz)   09/10/13 47.6 kg (105 lb)        /66 (BP Location: Left arm, Patient Position: Chair, Cuff Size: Adult Regular)   Pulse 61   SpO2 100%      Physical Exam  General Appearance: she is well developed, thin, no distress noted; wide based gait as she transitions to the examining table   Eyes:  conjutivae and extra-ocular motions are normal.                                    pupils round and reactive to light, no lid lag, no stare    HEENT:   oropharynx clear and moist, no JVD, no bruits      thyroid mildly prominent, no definite nodules   Cardiovascular:  regular rhythm, no murmurs, distal pulse palpable, no edema  Respiratory:        chest clear, no rales, no rhonchi    Musculoskeletal:  normal tone and strength; prominent left sternoclavicular joint  Psychological:          affect and judgment normal  Skin:  no skin lesions   Neurological: knee reflexes symmetric and hyperactive, bicipital reflexes normal, no resting tremor.     Lab Results  I reviewed prior lab results documented in EPIC.     TSH   Date Value Ref Range Status   01/30/2018 2.41 0.40 - 4.00 mU/L Final     T4 Free   Date Value Ref Range Status   01/30/2018 1.07 0.76 - 1.46 ng/dL Final     Assessment     1. Hashimoto thyroiditis   Clinically, she does not have definite signs or symptoms suggestive of hyper or hypothyroidism. We are going to recheck the thyroid hormone levels today and adjust the dose of levothyroxine accordingly.    2. Health maintenance   Advised to increase the calcium intake to 1 tablet BID and schedule a DEXA scan through her primary care clinic, in view of her high risk of falls.      Orders Placed This Encounter   Procedures     TSH     T4 free                    Again, thank you for allowing me to participate in the care of your patient.        Sincerely,        Megan Herron MD

## 2019-05-21 NOTE — PROGRESS NOTES
The patient is seen in f/up for evaluation of hypothyroidism.     Sheela Davis is a 54 year old female with a past medical history of cerebellar ataxia and a family history of hypothyroidism, who was diagnosed with Hashimoto thyroiditis in August 2013. The thyroid ultrasound done in October 2013 revealed a heterogeneous, normal size thyroid gland.     The dose of levothyroxine was progressively increased to a maximum dose of 150 mcg daily, in 2015. Since 2015, the dose was gradually decreased to the current dose of 112 mcg daily, which the patient has been taking since 2016.     The patient reports being compliant in taking the levothyroxine on an empty stomach, a couple of hrs prior to breakfast, separate from multivitamins and calcium supplements.     The ataxia is progressively getting worse. She continues to fall ~ 3 times a week. Denies fractures. She reports taking calcium, 600 mg daily, and vitamin D supplements.     Past Medical History   Diagnosis Date     Headache      Depression      Urinary incontinence      Bowel obstruction surgeries - last 4/2014       Hepatitis A 1968     age 3   Cerebellar ataxia   Hashimoto thyroiditis  Raynaud syndrome  Seizure 2/2019     Past Surgical History:   Procedure Laterality Date     bowel obstruction surgery  2008 and 2009      SLING TRANSVAGINAL       SMALL BOWEL RESECTION  Jan/2013     thumb reconstruction     Partial hysterectomy in 1995     Current Medications  2000 units vitamin D daily    Current Outpatient Medications:      baclofen (LIORESAL) 20 MG tablet, Take 1 tablet (20 mg) by mouth 3 times daily, Disp: 90 tablet, Rfl: 2     buPROPion (WELLBUTRIN XL) 300 MG 24 hr tablet, Take 150 mg by mouth 3 times daily , Disp: , Rfl:      Calcium Citrate-Vitamin D (CALCIUM CITRATE +D PO), Take by mouth daily , Disp: , Rfl:      divalproex sodium extended-release (DEPAKOTE ER) 500 MG 24 hr tablet, Take 500 mg by mouth twice daily, Disp: , Rfl:      escitalopram  (LEXAPRO) 20 MG tablet, Take 20 mg by mouth daily, Disp: , Rfl:      fish oil-omega-3 fatty acids 1000 MG capsule, Take 2 g by mouth daily, Disp: , Rfl:      levothyroxine (SYNTHROID/LEVOTHROID) 112 MCG tablet, TAKE ONE TABLET BY MOUTH ONE TIME DAILY, Disp: 60 tablet, Rfl: 0     multivitamin, therapeutic with minerals (MULTI-VITAMIN) TABS, Take 1 tablet by mouth daily, Disp: , Rfl:      Naproxen Sodium (ALEVE PO), Take 220 mg by mouth as needed for moderate pain, Disp: , Rfl:      omeprazole (PRILOSEC) 10 MG CR capsule, Take 2 capsules (20 mg) by mouth daily Take by mouth 30-60 minutes before a meal. (Patient not taking: Reported on 4/8/2019), Disp: 30 capsule, Rfl: 0     PredniSONE 10 MG (48) TBPK, Take 10 mg by mouth See Admin Instructions Take 60mg x 3 days, 50mg x 3d, 40mg x 3d, 30mg x 3d, 20mg x 3d, 10mg x 3d, 5mg x 3d then stop. (Patient not taking: Reported on 4/8/2019), Disp: 60 each, Rfl: 0    Family History   Problem Relation Age of Onset     Hypothyroidism  Sisters      Psychiatry Son      ADHD     Neurological Daughter      migraines     Cardiovascular Mother      CHF     Diabetes in his 30's - type 1  Father      Diabetes dx in her late 50s - tx with insulin  Mother    2 sisters dx with insulin dependent diabetes. Many family members are overweight.     Social History   with 3 children. She denies smoking, drinking alcohol or using illicit drugs. Occupation: retired .    Review of Systems   Systemic:             Weight stable  Eye:                      No eye symptoms   Carlito-Laryngeal:     Dysphagia for both solid and liquid foods on and off for the last 20 years - stable, no hoarseness, no cough     Breast:                  No breast symptoms  Cardiovascular:    No cardiovascular symptoms, no CP or palpitations   Pulmonary:           No pulmonary symptoms, no SOB or cough    Gastrointestinal:   No N/V, no diarrhea or constipation; some fecal incontinence    Genitourinary:       Urinary  incontinence, no increased thirst  Endocrine:            cold intolerance - all her life; no hot flashes   Neurological:        headaches - 1 time a week, no tremor, no numbness or tingling sensation, episodes of lightheadedness - present intermittently, sometimes lasting for days; they have been present since her teen years; h/o falls due to diff maintaining her balance  Musculoskeletal:  No joint pain; muscle pain in her legs; the left sternoclavicular joint prominence has been stable  Skin:                     No skin symptoms, no dry skin, no hair falling out   Psychological:     Depression - controlled, not suicidal               Vital Signs     Previous Weights:    Wt Readings from Last 10 Encounters:   04/08/19 48.6 kg (107 lb 3.2 oz)   09/14/18 51.3 kg (113 lb)   01/30/18 50.4 kg (111 lb 3 oz)   10/18/16 53 kg (116 lb 13.5 oz)   05/20/16 49.8 kg (109 lb 12.8 oz)   10/13/15 51.8 kg (114 lb 4 oz)   10/14/14 51 kg (112 lb 8 oz)   04/11/14 48.1 kg (106 lb)   10/22/13 49.2 kg (108 lb 8 oz)   09/10/13 47.6 kg (105 lb)        /66 (BP Location: Left arm, Patient Position: Chair, Cuff Size: Adult Regular)   Pulse 61   SpO2 100%      Physical Exam  General Appearance: she is well developed, thin, no distress noted; wide based gait as she transitions to the examining table   Eyes:  conjutivae and extra-ocular motions are normal.                                    pupils round and reactive to light, no lid lag, no stare    HEENT:   oropharynx clear and moist, no JVD, no bruits      thyroid mildly prominent, no definite nodules   Cardiovascular:  regular rhythm, no murmurs, distal pulse palpable, no edema  Respiratory:        chest clear, no rales, no rhonchi   Musculoskeletal:  normal tone and strength; prominent left sternoclavicular joint  Psychological:          affect and judgment normal  Skin:  no skin lesions   Neurological: knee reflexes symmetric and hyperactive, bicipital reflexes normal, no resting  tremor.     Lab Results  I reviewed prior lab results documented in EPIC.     TSH   Date Value Ref Range Status   01/30/2018 2.41 0.40 - 4.00 mU/L Final     T4 Free   Date Value Ref Range Status   01/30/2018 1.07 0.76 - 1.46 ng/dL Final     Assessment     1. Hashimoto thyroiditis   Clinically, she does not have definite signs or symptoms suggestive of hyper or hypothyroidism. We are going to recheck the thyroid hormone levels today and adjust the dose of levothyroxine accordingly.    2. Health maintenance   Advised to increase the calcium intake to 1 tablet BID and schedule a DEXA scan through her primary care clinic, in view of her high risk of falls.      Orders Placed This Encounter   Procedures     TSH     T4 free

## 2019-05-21 NOTE — NURSING NOTE
Sheela Davis's goals for this visit include:   Chief Complaint   Patient presents with     Thyroid Disease       She requests these members of her care team be copied on today's visit information: Yes    PCP: Alfredo Marrero    Referring Provider:  No referring provider defined for this encounter.    /66 (BP Location: Left arm, Patient Position: Chair, Cuff Size: Adult Regular)   Pulse 61   SpO2 100%     Do you need any medication refills at today's visit? Yes

## 2019-06-18 ENCOUNTER — TELEPHONE (OUTPATIENT)
Dept: NEUROLOGY | Facility: CLINIC | Age: 54
End: 2019-06-18

## 2019-06-18 DIAGNOSIS — G40.109 FOCAL EPILEPSY (H): Primary | ICD-10-CM

## 2019-06-18 RX ORDER — DIVALPROEX SODIUM 500 MG/1
TABLET, EXTENDED RELEASE ORAL
Qty: 62 TABLET | Refills: 1 | Status: SHIPPED | OUTPATIENT
Start: 2019-06-18 | End: 2019-07-22

## 2019-06-18 NOTE — TELEPHONE ENCOUNTER
What is the concern that needs to be addressed by a nurse? Medication questions. Wants to refill but has to come in clinic. divalproex sodium extended-release (DEPAKOTE ER) 500 MG 24 hr tablet    May a detailed message be left on voicemail? yes    Date of last office visit: 07/11/2019    Message routed to: Jose blank       Nurse received above note; placed call to patient; patient indicates that she needs a refill before the time of her next appointment in July as she would run out before this time.    Nurse indicated he could send a refill with enough to get her through to the appointment.    No further questions.

## 2019-06-27 ENCOUNTER — TRANSFERRED RECORDS (OUTPATIENT)
Dept: HEALTH INFORMATION MANAGEMENT | Facility: CLINIC | Age: 54
End: 2019-06-27

## 2019-07-12 ENCOUNTER — TELEPHONE (OUTPATIENT)
Dept: NEUROLOGY | Facility: CLINIC | Age: 54
End: 2019-07-12

## 2019-07-12 NOTE — TELEPHONE ENCOUNTER
[facility] Call Center    Phone Message    May a detailed message be left on voicemail: yes    Reason for Call: Other: pt calling to schedule appt in ataxia clinic with dr hartley, please call     Action Taken: Message routed to:  [facility] neurology

## 2019-07-12 NOTE — TELEPHONE ENCOUNTER
[facility] Call [facility]    Phone Message    May a detailed message be left on voicemail: yes    Reason for Call: Other: Pt thought she missed a call from Nurse Annalise - Pt calling back - Pt needs to schedule a Return Pt Appt with Dr Oviedo for Ataxia - Please call her back - Thanks!     Action Taken: Message routed to:  [facility] Neurology Clinic

## 2019-07-16 ENCOUNTER — TELEPHONE (OUTPATIENT)
Dept: NEUROLOGY | Facility: CLINIC | Age: 54
End: 2019-07-16

## 2019-07-16 NOTE — TELEPHONE ENCOUNTER
Called pt, no answer. Left voice message asking if she wanted this writer to leave a date and time of a return appointment on her phone and she could call back with an affirmation of the appointment date.

## 2019-07-16 NOTE — TELEPHONE ENCOUNTER
Pt called and a return visit with Dr. Oviedo was made for Oct 4 at 11:30am. She said she had a seizure in Feb. And is on seizure meds. She sees a different doctor for those medications.

## 2019-07-22 ENCOUNTER — OFFICE VISIT (OUTPATIENT)
Dept: NEUROLOGY | Facility: CLINIC | Age: 54
End: 2019-07-22
Payer: MEDICARE

## 2019-07-22 VITALS
SYSTOLIC BLOOD PRESSURE: 93 MMHG | WEIGHT: 109.2 LBS | HEART RATE: 70 BPM | DIASTOLIC BLOOD PRESSURE: 63 MMHG | BODY MASS INDEX: 19.65 KG/M2

## 2019-07-22 DIAGNOSIS — G40.109 FOCAL EPILEPSY (H): Primary | ICD-10-CM

## 2019-07-22 LAB
ERYTHROCYTE [DISTWIDTH] IN BLOOD BY AUTOMATED COUNT: 12.7 % (ref 12.9–14.7)
HCT VFR BLD AUTO: 36.2 % (ref 36.1–45.7)
HEMOGLOBIN: 12.3 G/DL (ref 11.7–15.7)
MCH RBC QN AUTO: 31.4 PG (ref 27.8–32.1)
MCHC RBC AUTO-ENTMCNC: 34 G/DL (ref 32.9–36)
MCV RBC AUTO: 92 FL (ref 80–94)
PLATELET # BLD AUTO: 137 10^9/L (ref 166–396)
RBC # BLD AUTO: 3.9 10^12/L (ref 4.1–5.2)
WBC # BLD AUTO: 4.2 10^9/L (ref 4.5–5.2)

## 2019-07-22 RX ORDER — DIVALPROEX SODIUM 500 MG/1
TABLET, EXTENDED RELEASE ORAL
Qty: 186 TABLET | Refills: 1 | Status: SHIPPED | OUTPATIENT
Start: 2019-07-22 | End: 2020-02-06

## 2019-07-22 NOTE — PROGRESS NOTES
"[facility]      Patient:  Sheela Davis  :  1965   Age:  54 year old   Today's Office Visit:  2019    Epilepsy Data:  The patient had an episode of loss of consciousness on 2019.  She was very stressed when she went to bed.  Her daughter came home and saw her in the bed doing something unusual with her hands up in the air, but she does not recall that.  Her daughter called 911, and she was taken to the hospital.  She was intubated and was placed on Keppra. EEG was inconclusive as was read as \"The patient was on propofol, and there was suppression of activities and diffuse slowing; however, there were no epileptiform discharges.\"  Brain MRI on 2019 showed no acute intracranial abnormality, asymmetric atrophy of the cerebellum, less parenchymal loss in the cerebral hemisphere, patent intracranial circulation, patent carotid and vertebral arteries.  Due to the patient's history of depression and anxiety, Keppra was switched to Depakote. The patient was extubated.  She does not recall anything during the 3 days of hospitalization.  She has been continued on Depakote since then.  She did not have any similar episodes in the past or since the first episode.  She denies a history of childhood seizures, febrile seizures, meningitis, encephalitis, family history of epilepsy or significant head trauma.     EEG on 4/15/19 showed focal slowing and occasional epileptiform discharges over the left frontotemporal region during sleep.     History of Present Illness:    The patient did not have any seizures since last visit. She is taking Depakote  mg bid.     She tripped and broke her right forearm when she was vacuuming last month. She knows it wasn't a seizure, she did not lose consciousness.     Current Outpatient Medications   Medication Sig Dispense Refill     baclofen (LIORESAL) 20 MG tablet Take 1 tablet (20 mg) by mouth 3 times daily 90 tablet 2     Calcium " Citrate-Vitamin D (CALCIUM CITRATE +D PO) Take by mouth daily        divalproex sodium extended-release (DEPAKOTE ER) 500 MG 24 hr tablet Take 500 mg by mouth twice daily 62 tablet 1     fish oil-omega-3 fatty acids 1000 MG capsule Take 2 g by mouth daily       levothyroxine (SYNTHROID/LEVOTHROID) 112 MCG tablet Take 1 tablet (112 mcg) by mouth daily 90 tablet 3     multivitamin, therapeutic with minerals (MULTI-VITAMIN) TABS Take 1 tablet by mouth daily       Naproxen Sodium (ALEVE PO) Take 220 mg by mouth as needed for moderate pain       buPROPion (WELLBUTRIN XL) 300 MG 24 hr tablet Take 150 mg by mouth 3 times daily        escitalopram (LEXAPRO) 20 MG tablet Take 20 mg by mouth daily       omeprazole (PRILOSEC) 10 MG CR capsule Take 2 capsules (20 mg) by mouth daily Take by mouth 30-60 minutes before a meal. (Patient not taking: Reported on 4/8/2019) 30 capsule 0     PredniSONE 10 MG (48) TBPK Take 10 mg by mouth See Admin Instructions Take 60mg x 3 days, 50mg x 3d, 40mg x 3d, 30mg x 3d, 20mg x 3d, 10mg x 3d, 5mg x 3d then stop. (Patient not taking: Reported on 4/8/2019) 60 each 0      Review of Systems:  Lethargy / Tiredness:  Yes  Double Vision:  No  Sleepiness:  Yes  Depression:  Yes  Memory Problems:  No  Poor Balance:  Yes  Dizziness:  No  Behavioral Changes:  No  Skin: negative  Respiratory: No shortness of breath and No cough  Cardiovascular: negative  Have you experienced a traumatic fall since your last visit: YES, tripped and broke her arm.   Are these falls related to your seizures:  NO    Exam:    BP 93/63 (BP Location: Left arm, Patient Position: Sitting, Cuff Size: Adult Regular)   Pulse 70   Wt 109 lb 3.2 oz (49.5 kg)   BMI 19.65 kg/m       Wt Readings from Last 5 Encounters:   07/22/19 109 lb 3.2 oz (49.5 kg)   04/08/19 107 lb 3.2 oz (48.6 kg)   09/14/18 113 lb (51.3 kg)   01/30/18 111 lb 3 oz (50.4 kg)   10/18/16 116 lb 13.5 oz (53 kg)     General Appearance: Alert, awake, cooperative,  pleasant, NAD  Gait: spastic slow gait, somewhat spastic.  Attention Span:  Normal  Language/speech: no aphasia or dysarthria  Extraocular Movements:  Normal  Coordination: slow FNF  Facial Strength:  Normal  Tongue Strength:  Normal  Motor Exam:Slight increased tone in lower extremities.  Normal bulk and strength 5/5 bilaterally.    Assessment and Plan:    1. Focal epilepsy: the patient did not have any seizures since starting medication. Currently on Depakote 500 mg bid, no known side effects. Depakote level 39 () 6/27/19.    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 Oviedo. Taking Baclofen 20 mg three times a day.    3. Low Plt on the last CBC: Plt 137 (166-396). Need to monitor Plts.      - Repeat CBC to monitor for low Plt.  - Continue Depakote  mg bid.   - RTC in 6 months.      As described above, I met with the patient for 25 minutes and during this time counseling was greater than 50% of the visit time.  Alice Joshi MD

## 2019-07-22 NOTE — LETTER
"2019       RE: Sheela Davis  : 1965   MRN: [id number]      Dear Colleague,    Thank you for referring your patient, Sheela Davis, to the [facility] EPILEPSY CARE at [facility]. Please see a copy of my visit note below.    [facility]      Patient:  Sheela Davis  :  1965   Age:  54 year old   Today's Office Visit:  2019    Epilepsy Data:  The patient had an episode of loss of consciousness on 2019.  She was very stressed when she went to bed.  Her daughter came home and saw her in the bed doing something unusual with her hands up in the air, but she does not recall that.  Her daughter called 911, and she was taken to the hospital.  She was intubated and was placed on Keppra. EEG was inconclusive as was read as \"The patient was on propofol, and there was suppression of activities and diffuse slowing; however, there were no epileptiform discharges.\"  Brain MRI on 2019 showed no acute intracranial abnormality, asymmetric atrophy of the cerebellum, less parenchymal loss in the cerebral hemisphere, patent intracranial circulation, patent carotid and vertebral arteries.  Due to the patient's history of depression and anxiety, Keppra was switched to Depakote. The patient was extubated.  She does not recall anything during the 3 days of hospitalization.  She has been continued on Depakote since then.  She did not have any similar episodes in the past or since the first episode.  She denies a history of childhood seizures, febrile seizures, meningitis, encephalitis, family history of epilepsy or significant head trauma.     EEG on 4/15/19 showed focal slowing and occasional epileptiform discharges over the left frontotemporal region during sleep.     History of Present Illness:    The patient did not have any seizures since last visit. She is taking Depakote  mg bid.     She tripped and broke her right forearm " when she was vacuuming last month. She knows it wasn't a seizure, she did not lose consciousness.     Current Outpatient Medications   Medication Sig Dispense Refill     baclofen (LIORESAL) 20 MG tablet Take 1 tablet (20 mg) by mouth 3 times daily 90 tablet 2     Calcium Citrate-Vitamin D (CALCIUM CITRATE +D PO) Take by mouth daily        divalproex sodium extended-release (DEPAKOTE ER) 500 MG 24 hr tablet Take 500 mg by mouth twice daily 62 tablet 1     fish oil-omega-3 fatty acids 1000 MG capsule Take 2 g by mouth daily       levothyroxine (SYNTHROID/LEVOTHROID) 112 MCG tablet Take 1 tablet (112 mcg) by mouth daily 90 tablet 3     multivitamin, therapeutic with minerals (MULTI-VITAMIN) TABS Take 1 tablet by mouth daily       Naproxen Sodium (ALEVE PO) Take 220 mg by mouth as needed for moderate pain       buPROPion (WELLBUTRIN XL) 300 MG 24 hr tablet Take 150 mg by mouth 3 times daily        escitalopram (LEXAPRO) 20 MG tablet Take 20 mg by mouth daily       omeprazole (PRILOSEC) 10 MG CR capsule Take 2 capsules (20 mg) by mouth daily Take by mouth 30-60 minutes before a meal. (Patient not taking: Reported on 4/8/2019) 30 capsule 0     PredniSONE 10 MG (48) TBPK Take 10 mg by mouth See Admin Instructions Take 60mg x 3 days, 50mg x 3d, 40mg x 3d, 30mg x 3d, 20mg x 3d, 10mg x 3d, 5mg x 3d then stop. (Patient not taking: Reported on 4/8/2019) 60 each 0      Review of Systems:  Lethargy / Tiredness:  Yes  Double Vision:  No  Sleepiness:  Yes  Depression:  Yes  Memory Problems:  No  Poor Balance:  Yes  Dizziness:  No  Behavioral Changes:  No  Skin: negative  Respiratory: No shortness of breath and No cough  Cardiovascular: negative  Have you experienced a traumatic fall since your last visit: YES, tripped and broke her arm.   Are these falls related to your seizures:  NO    Exam:    BP 93/63 (BP Location: Left arm, Patient Position: Sitting, Cuff Size: Adult Regular)   Pulse 70   Wt 109 lb 3.2 oz (49.5 kg)   BMI  19.65 kg/m        Wt Readings from Last 5 Encounters:   07/22/19 109 lb 3.2 oz (49.5 kg)   04/08/19 107 lb 3.2 oz (48.6 kg)   09/14/18 113 lb (51.3 kg)   01/30/18 111 lb 3 oz (50.4 kg)   10/18/16 116 lb 13.5 oz (53 kg)     General Appearance: Alert, awake, cooperative, pleasant, NAD  Gait: spastic slow gait, somewhat spastic.  Attention Span:  Normal  Language/speech: no aphasia or dysarthria  Extraocular Movements:  Normal  Coordination: slow FNF  Facial Strength:  Normal  Tongue Strength:  Normal  Motor Exam:Slight increased tone in lower extremities.  Normal bulk and strength 5/5 bilaterally.    Assessment and Plan:    1. Focal epilepsy: the patient did not have any seizures since starting medication. Currently on Depakote 500 mg bid, no known side effects. Depakote level 39 () 6/27/19.    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 Oviedo. Taking Baclofen 20 mg three times a day.    3. Low Plt on the last CBC: Plt 137 (166-396). Need to monitor Plts.      - Repeat CBC to monitor for low Plt.  - Continue Depakote  mg bid.   - RTC in 6 months.      As described above, I met with the patient for 25 minutes and during this time counseling was greater than 50% of the visit time.  Alice Joshi MD                        Again, thank you for allowing me to participate in the care of your patient.      Sincerely,    Alice Joshi MD

## 2019-07-30 ENCOUNTER — TRANSFERRED RECORDS (OUTPATIENT)
Dept: HEALTH INFORMATION MANAGEMENT | Facility: CLINIC | Age: 54
End: 2019-07-30

## 2019-09-16 DIAGNOSIS — R27.0 ATAXIA: ICD-10-CM

## 2019-09-16 NOTE — TELEPHONE ENCOUNTER
Rx Authorization:    Requested Medication/ Dose baclofen (LIORESAL) 20 MG tablet    Date last refill ordered: 01/08/19    Quantity ordered: 90    # refills: 2    Date of last clinic visit with ordering provider: 09/04/18    Date of next clinic visit with ordering provider: 10/14/19    All pertinent protocol data (lab date/result):     Include pertinent information from patients message:            Clinic notes from Sept 2018 stated pt can start taking baclofen after her prednisone. She should start with 10mg. Of baclofen tid and increase to 20mg tid. Called her pharmacy, pt didn't start taking baclofen again until June 2019. Called pt, no answer, left voice message asking how much baclofen is she taking and if it is helping her spasms. Asked her to call this writer back and leave a message with info. Pt does have a follow up appointment on Oct 4 with Dr. Oviedo.     Will wait to speak to pt before refill is sent.

## 2019-09-20 ENCOUNTER — TELEPHONE (OUTPATIENT)
Dept: NEUROLOGY | Facility: CLINIC | Age: 54
End: 2019-09-20

## 2019-09-20 RX ORDER — BACLOFEN 20 MG/1
20 TABLET ORAL 3 TIMES DAILY
Qty: 90 TABLET | Refills: 1 | OUTPATIENT
Start: 2019-09-20

## 2019-09-20 NOTE — TELEPHONE ENCOUNTER
Spoke to pt she has enough pills to last until she sees Dr. Oviedo on Oct 4th. She will discuss her dosage at that time.

## 2019-09-20 NOTE — TELEPHONE ENCOUNTER
Pt called and left a message. She said she is taking 20mg. Of baclofen per day. She said she has questions but will talk about them when she sees Dr. Oviedo on Oct 4. She also said she has enough medication to last until she comes her her clinic visit so her medication does not have to be refilled before the visit.

## 2019-09-23 NOTE — TELEPHONE ENCOUNTER
[facility] Call Center    Phone Message    May a detailed message be left on voicemail: yes    Reason for Call: Medication Refill Request    Has the patient contacted the pharmacy for the refill? Yes   Name of medication being requested: baclofen (LIORESAL) 20 MG tablet  Provider who prescribed the medication: Ivelisse  Pharmacy: [id number] [place], [place] - [address]  Date medication is needed: as soon as possible     Called [facility] and told them that the pt has a enough medication to last her until she sees Dr. Oviedo in clinic on Oct 4. She will discuss the dosage that he wants her on at that clinic visit and this prescription will not be filled before that time.          Action Taken: Message routed to:  [facility] neuro

## 2019-10-04 ENCOUNTER — TELEPHONE (OUTPATIENT)
Dept: NEUROLOGY | Facility: CLINIC | Age: 54
End: 2019-10-04

## 2019-10-04 NOTE — TELEPHONE ENCOUNTER
[facility] Call [facility]    Phone Message    May a detailed message be left on voicemail: yes    Reason for Call: Other: Calling to schedule ump return Ataxia appointment with Annalise.     Action Taken: Message routed to:  [facility] clinic coord neurology

## 2019-10-08 NOTE — TELEPHONE ENCOUNTER
Pt had an appointment on 10/4/19 which she was a no show for that appointment. This writer tried to get a hold of her on her cell phone and her 's cell phone numbers, unable to and not able to leave a message.  Pt called and asked to be called back to schedule an appointment. No answer, left voice message today to return call.

## 2019-10-10 ENCOUNTER — TELEPHONE (OUTPATIENT)
Dept: NEUROLOGY | Facility: CLINIC | Age: 54
End: 2019-10-10

## 2019-10-10 NOTE — TELEPHONE ENCOUNTER
[facility] Call Center    Phone Message    May a detailed message be left on voicemail: yes    Reason for Call: Other: Per call from PT missed an APPT on 10/4 with Dr Oviedo and is requesting to reschedule. Please reach out to the PT.      Action Taken: Message routed to:  [facility] Neurology     Called pt, phone number did not ring it went directly to voice mail. Left message for her to call back and asked if there was any other number I could use to call her.

## 2019-10-15 ENCOUNTER — TELEPHONE (OUTPATIENT)
Dept: NEUROLOGY | Facility: CLINIC | Age: 54
End: 2019-10-15

## 2019-10-15 NOTE — TELEPHONE ENCOUNTER
Called pt to arrange for her to be seen in the Ataxia Clinic. No answer, left voice message for pt to return call.

## 2019-10-27 ENCOUNTER — HEALTH MAINTENANCE LETTER (OUTPATIENT)
Age: 54
End: 2019-10-27

## 2019-11-15 ENCOUNTER — OFFICE VISIT (OUTPATIENT)
Dept: NEUROLOGY | Facility: CLINIC | Age: 54
End: 2019-11-15
Payer: MEDICARE

## 2019-11-15 ENCOUNTER — APPOINTMENT (OUTPATIENT)
Dept: LAB | Facility: CLINIC | Age: 54
End: 2019-11-15
Payer: MEDICARE

## 2019-11-15 VITALS
HEART RATE: 75 BPM | BODY MASS INDEX: 19.14 KG/M2 | RESPIRATION RATE: 15 BRPM | OXYGEN SATURATION: 97 % | SYSTOLIC BLOOD PRESSURE: 107 MMHG | HEIGHT: 63 IN | WEIGHT: 108 LBS | DIASTOLIC BLOOD PRESSURE: 71 MMHG

## 2019-11-15 DIAGNOSIS — R27.0 ATAXIA: Primary | ICD-10-CM

## 2019-11-15 PROBLEM — K75.9 INFLAMMATORY DISEASE OF LIVER: Status: ACTIVE | Noted: 2019-11-15

## 2019-11-15 PROBLEM — N39.41 URGE INCONTINENCE OF URINE: Status: ACTIVE | Noted: 2019-11-15

## 2019-11-15 PROBLEM — F32.9 MAJOR DEPRESSION, SINGLE EPISODE: Status: ACTIVE | Noted: 2019-11-15

## 2019-11-15 PROBLEM — F09 COGNITIVE DISORDER: Status: ACTIVE | Noted: 2019-11-15

## 2019-11-15 PROBLEM — M81.0 SENILE OSTEOPOROSIS: Status: ACTIVE | Noted: 2019-11-15

## 2019-11-15 PROBLEM — N31.9 NEUROGENIC DYSFUNCTION OF THE URINARY BLADDER: Status: ACTIVE | Noted: 2019-11-15

## 2019-11-15 PROBLEM — Z00.00 WELL ADULT HEALTH CHECK: Status: ACTIVE | Noted: 2019-11-15

## 2019-11-15 PROBLEM — R32 URINARY INCONTINENCE: Status: ACTIVE | Noted: 2019-11-15

## 2019-11-15 PROBLEM — R13.10 DYSPHAGIA: Status: ACTIVE | Noted: 2019-11-15

## 2019-11-15 PROBLEM — R53.81 PHYSICAL DECONDITIONING: Status: ACTIVE | Noted: 2019-02-07

## 2019-11-15 PROBLEM — Z63.0 MARITAL CONFLICT: Status: ACTIVE | Noted: 2019-11-15

## 2019-11-15 PROBLEM — N32.81 OVERACTIVE BLADDER: Status: ACTIVE | Noted: 2019-11-15

## 2019-11-15 PROCEDURE — 86376 MICROSOMAL ANTIBODY EACH: CPT | Performed by: PSYCHIATRY & NEUROLOGY

## 2019-11-15 PROCEDURE — 86800 THYROGLOBULIN ANTIBODY: CPT | Performed by: PSYCHIATRY & NEUROLOGY

## 2019-11-15 ASSESSMENT — MIFFLIN-ST. JEOR: SCORE: 1041.07

## 2019-11-15 ASSESSMENT — PAIN SCALES - GENERAL: PAINLEVEL: NO PAIN (0)

## 2019-11-15 NOTE — PROGRESS NOTES
Service Date: 11/15/2019      INTERVAL HISTORY:  Sheela Kline returned for followup today.  She is a 54-year-old woman with ataxia of unknown etiology.  She does have very high thyroid antibodies and she was treated with prednisone in the past with no improvement.  In addition to high thyroid antibodies she does have vitiligo which was difficult to notice since it was quite symmetrical in the face.  She reports no other autoimmune disorders.  Her ataxia remained more or less the same over the last year.        PHYSICAL EXAMINATION:  She continues to have dysarthric speech, some dysmetria of the upper extremities about 2 cm or so.  Could stand with the feet together for more than 10 seconds, could  tandem with the left foot in front but could not do it with the right foot in front.  Her exam was otherwise unchanged.        I will check thyroid antibodies today.  There does not seem to be any correlation between her ataxia and the thyroid antibody levels which fluctuated between 600 to over 1000.  She currently takes baclofen once a day and she reports no cramps or spasms.  She will be happy to go off baclofen.  I did tell her that if she developed spasms or cramps, especially at night, she  can go back on the medication.        MD PHAM Valera MD             D: 11/15/2019   T: 11/15/2019   MT: AKA      Name:     SHEELA KLINE   MRN:      [id number]        Account:      [id number]   :      1965           Service Date: 11/15/2019      Document: [id number]

## 2019-11-15 NOTE — NURSING NOTE
Pt is here for an ataxia check up. Pt states she has occasional headache. She said she does not have double or blurred vision. Her last eye exam was about a year or two ago. She said she chokes sometimes when she eats or drinks. She had a swallow study done a long time ago. She said her arms and hands are weaker than a year ago, she said she doesn't do as much as she did with her arms. She said she had a seizure on Feb 4th and is now on Depakote 500mg. Twice per day.  She said she having problems with bladder and is going to see the urologist . She has had botox injections for bladder problems. She sees someone in [place] for this problem. She said she uses her walker most of the time. She also has a scooter that she uses outside. She fell off the scooter one time and hurt her shoulder about 2 or 3 months ago. She said she fell in the house and broke her wrist in May. They live in a 4 level split home and they are looking for a one story home. Pt said she is prescribes baclofen 20mg tid but she is only taking it once per day and she said that it doesn't work. She said she sleeps good at night except she gets up 2 or 3 times per night.

## 2019-11-15 NOTE — NURSING NOTE
Chief Complaint   Patient presents with     Gait Problem     [facility] RETURN ATAXIA F/U        Eric Diaz, EMT

## 2019-11-15 NOTE — LETTER
RE: Sheela Kline  [address]  [place] [address]     Dear Colleague,    Thank you for referring your patient, Sheela Kline, to the [facility]UROLOG[facility]. Please see a copy of my visit note below.    Service Date: 11/15/2019      INTERVAL HISTORY:  Sheela Kline returned for followup today.  She is a 54-year-old woman with ataxia of unknown etiology.  She does have very high thyroid antibodies and she was treated with prednisone in the past with no improvement.  In addition to high thyroid antibodies she does have vitiligo which was difficult to notice since it was quite symmetrical in the face.  She reports no other autoimmune disorders.  Her ataxia remained more or less the same over the last year.        PHYSICAL EXAMINATION:  She continues to have dysarthric speech, some dysmetria of the upper extremities about 2 cm or so.  Could stand with the feet together for more than 10 seconds, could  tandem with the left foot in front but could not do it with the right foot in front.  Her exam was otherwise unchanged.        I will check thyroid antibodies today.  There does not seem to be any correlation between her ataxia and the thyroid antibody levels which fluctuated between 600 to over 1000.  She currently takes baclofen once a day and she reports no cramps or spasms.  She will be happy to go off baclofen.  I did tell her that if she developed spasms or cramps, especially at night, she  can go back on the medication.        Sadnhya Oviedo MD      D: 11/15/2019   T: 11/15/2019   MT: AKA      Name:     SHEELA KLINE   MRN:      [id number]        Account:      [id number]   :      1965           Service Date: 11/15/2019      Document: [id number]

## 2019-11-18 LAB
THYROGLOB AB SERPL IA-ACNC: 1373 IU/ML (ref 0–40)
THYROPEROXIDASE AB SERPL-ACNC: 427 IU/ML

## 2020-02-06 ENCOUNTER — OFFICE VISIT (OUTPATIENT)
Dept: NEUROLOGY | Facility: CLINIC | Age: 55
End: 2020-02-06
Payer: COMMERCIAL

## 2020-02-06 VITALS
SYSTOLIC BLOOD PRESSURE: 110 MMHG | DIASTOLIC BLOOD PRESSURE: 74 MMHG | HEART RATE: 76 BPM | WEIGHT: 110 LBS | BODY MASS INDEX: 19.8 KG/M2

## 2020-02-06 DIAGNOSIS — G40.109 FOCAL EPILEPSY (H): ICD-10-CM

## 2020-02-06 RX ORDER — DIVALPROEX SODIUM 500 MG/1
TABLET, EXTENDED RELEASE ORAL
Qty: 186 TABLET | Refills: 1 | Status: SHIPPED | OUTPATIENT
Start: 2020-02-06 | End: 2020-09-01

## 2020-02-06 ASSESSMENT — PAIN SCALES - GENERAL: PAINLEVEL: NO PAIN (0)

## 2020-02-06 NOTE — LETTER
"2020       RE: Sheela Davis  : 1965   MRN: [id number]      Dear Colleague,    Thank you for referring your patient, Sheela Davis, to the [facility] EPILEPSY CARE at [facility]. Please see a copy of my visit note below.    [facility]      Patient:  Sheela Davis  :  1965   Age:  54 year old   Today's Office Visit:  2020    Epilepsy Data:  The patient had an episode of loss of consciousness on 2019.  She was very stressed when she went to bed.  Her daughter came home and saw her in the bed doing something unusual with her hands up in the air, but she does not recall that.  Her daughter called 911, and she was taken to the hospital.  She was intubated and was placed on Keppra. EEG was inconclusive as was read as \"The patient was on propofol, and there was suppression of activities and diffuse slowing; however, there were no epileptiform discharges.\"  Brain MRI on 2019 showed no acute intracranial abnormality, asymmetric atrophy of the cerebellum, less parenchymal loss in the cerebral hemisphere, patent intracranial circulation, patent carotid and vertebral arteries.  Due to the patient's history of depression and anxiety, Keppra was switched to Depakote. The patient was extubated.  She does not recall anything during the 3 days of hospitalization.  She has been continued on Depakote since then.  She did not have any similar episodes in the past or since the first episode.  She denies a history of childhood seizures, febrile seizures, meningitis, encephalitis, family history of epilepsy or significant head trauma.     EEG on 4/15/19 showed focal slowing and occasional epileptiform discharges over the left frontotemporal region during sleep.         History of Present Illness:   Patient is here for a follow-up on her seizures. No seizures since last visit in 2019. She is taking Depakote 500 mg bid. Denies " side effects including weight gain, hair loss, tremors or sleepiness.    Ataxia of unknown etiology: Stable.  She is following with Dr. Oviedo.  She had a thyroid antibodies check in November 2019.  There did not seem to be any correlation between her ataxia and a thyroid antibody levels which fluctuated between 600 to over thousand.  She came off baclofen, but has noted worsening of spasms so she is considering restarting that after discussion with Dr. Sharp    Current Outpatient Medications   Medication Sig Dispense Refill     baclofen (LIORESAL) 20 MG tablet Take 1 tablet (20 mg) by mouth 3 times daily 90 tablet 2     Calcium Citrate-Vitamin D (CALCIUM CITRATE +D PO) Take by mouth daily        divalproex sodium extended-release (DEPAKOTE ER) 500 MG 24 hr tablet Take 500 mg by mouth twice daily 186 tablet 1     escitalopram (LEXAPRO) 20 MG tablet Take 20 mg by mouth daily       fish oil-omega-3 fatty acids 1000 MG capsule Take 2 g by mouth daily       levothyroxine (SYNTHROID/LEVOTHROID) 112 MCG tablet Take 1 tablet (112 mcg) by mouth daily 90 tablet 3     multivitamin, therapeutic with minerals (MULTI-VITAMIN) TABS Take 1 tablet by mouth daily       Naproxen Sodium (ALEVE PO) Take 220 mg by mouth as needed for moderate pain       omeprazole (PRILOSEC) 10 MG CR capsule Take 2 capsules (20 mg) by mouth daily Take by mouth 30-60 minutes before a meal. 30 capsule 0     PredniSONE 10 MG (48) TBPK Take 10 mg by mouth See Admin Instructions Take 60mg x 3 days, 50mg x 3d, 40mg x 3d, 30mg x 3d, 20mg x 3d, 10mg x 3d, 5mg x 3d then stop. 60 each 0        Perceived AED Side Effects:  No    Medication Notes:        AED Medication Compliance:  compliant most of the time  Using a pill box:  Yes    Review of Systems:  Lethargy / Tiredness:  unknown  Nausea / Vomiting:  No  Double Vision:  No  Sleepiness:  No  Depression:  No  Slowed Cognitive Function:  No  Memory Problems:  No  Poor Balance:  Yes  Dizziness:  Yes  Appetite  Changes:  Yes  Blurred Vision:  No  Sleep Changes:  No  Behavioral Changes:  No  Skin: negative  Respiratory: No shortness of breath and No cough  Cardiovascular: negative  Have you experienced a traumatic fall since your last visit: NO    Exam:    /74   Pulse 76   Wt 110 lb (49.9 kg)   Breastfeeding No   BMI 19.80 kg/m        Wt Readings from Last 5 Encounters:   02/06/20 110 lb (49.9 kg)   11/15/19 108 lb (49 kg)   07/22/19 109 lb 3.2 oz (49.5 kg)   04/08/19 107 lb 3.2 oz (48.6 kg)   09/14/18 113 lb (51.3 kg)     General Appearance: Alert, awake, cooperative, pleasant, NAD  Gait: spastic slow gait, somewhat spastic.  Attention Span:  Normal  Language/speech: no aphasia or dysarthria  Extraocular Movements:  Normal  Coordination: slow FNF  Facial Strength:  Normal  Tongue Strength:  Normal  Motor Exam: increased tone in lower extremities.  Normal bulk and strength 5/5 bilaterally.  DTRs: 2+ in upper ext, 4+ in patellae, sustained ankle clonus bilaterally     Assessment and Plan:     1. Focal epilepsy: the patient did not have any seizures since starting Depakote. Currently on Depakote 500 mg bid, no known side effects.     - Continue Depakote as before  - Obtain Depakote level   - RTC in 6-month    As described above, I met with the patient for 15 minutes and during this time counseling was greater than 50% of the visit time.  Alice Joshi MD                        Again, thank you for allowing me to participate in the care of your patient.      Sincerely,    Alice Joshi MD

## 2020-02-06 NOTE — PROGRESS NOTES
"[email or web address]      Patient:  Sheela Davis  :  1965   Age:  54 year old   Today's Office Visit:  2020    Epilepsy Data:  The patient had an episode of loss of consciousness on 2019.  She was very stressed when she went to bed.  Her daughter came home and saw her in the bed doing something unusual with her hands up in the air, but she does not recall that.  Her daughter called 911, and she was taken to the hospital.  She was intubated and was placed on Keppra. EEG was inconclusive as was read as \"The patient was on propofol, and there was suppression of activities and diffuse slowing; however, there were no epileptiform discharges.\"  Brain MRI on 2019 showed no acute intracranial abnormality, asymmetric atrophy of the cerebellum, less parenchymal loss in the cerebral hemisphere, patent intracranial circulation, patent carotid and vertebral arteries.  Due to the patient's history of depression and anxiety, Keppra was switched to Depakote. The patient was extubated.  She does not recall anything during the 3 days of hospitalization.  She has been continued on Depakote since then.  She did not have any similar episodes in the past or since the first episode.  She denies a history of childhood seizures, febrile seizures, meningitis, encephalitis, family history of epilepsy or significant head trauma.     EEG on 4/15/19 showed focal slowing and occasional epileptiform discharges over the left frontotemporal region during sleep.         History of Present Illness:   Patient is here for a follow-up on her seizures. No seizures since last visit in 2019. She is taking Depakote 500 mg bid. Denies side effects including weight gain, hair loss, tremors or sleepiness.    Ataxia of unknown etiology: Stable.  She is following with Dr. Oviedo.  She had a thyroid antibodies check in 2019.  There did not seem to be any correlation between her ataxia and a thyroid " antibody levels which fluctuated between 600 to over thousand.  She came off baclofen, but has noted worsening of spasms so she is considering restarting that after discussion with Dr. Sharp    Current Outpatient Medications   Medication Sig Dispense Refill     baclofen (LIORESAL) 20 MG tablet Take 1 tablet (20 mg) by mouth 3 times daily 90 tablet 2     Calcium Citrate-Vitamin D (CALCIUM CITRATE +D PO) Take by mouth daily        divalproex sodium extended-release (DEPAKOTE ER) 500 MG 24 hr tablet Take 500 mg by mouth twice daily 186 tablet 1     escitalopram (LEXAPRO) 20 MG tablet Take 20 mg by mouth daily       fish oil-omega-3 fatty acids 1000 MG capsule Take 2 g by mouth daily       levothyroxine (SYNTHROID/LEVOTHROID) 112 MCG tablet Take 1 tablet (112 mcg) by mouth daily 90 tablet 3     multivitamin, therapeutic with minerals (MULTI-VITAMIN) TABS Take 1 tablet by mouth daily       Naproxen Sodium (ALEVE PO) Take 220 mg by mouth as needed for moderate pain       omeprazole (PRILOSEC) 10 MG CR capsule Take 2 capsules (20 mg) by mouth daily Take by mouth 30-60 minutes before a meal. 30 capsule 0     PredniSONE 10 MG (48) TBPK Take 10 mg by mouth See Admin Instructions Take 60mg x 3 days, 50mg x 3d, 40mg x 3d, 30mg x 3d, 20mg x 3d, 10mg x 3d, 5mg x 3d then stop. 60 each 0        Perceived AED Side Effects:  No    Medication Notes:        AED Medication Compliance:  compliant most of the time  Using a pill box:  Yes    Review of Systems:  Lethargy / Tiredness:  unknown  Nausea / Vomiting:  No  Double Vision:  No  Sleepiness:  No  Depression:  No  Slowed Cognitive Function:  No  Memory Problems:  No  Poor Balance:  Yes  Dizziness:  Yes  Appetite Changes:  Yes  Blurred Vision:  No  Sleep Changes:  No  Behavioral Changes:  No  Skin: negative  Respiratory: No shortness of breath and No cough  Cardiovascular: negative  Have you experienced a traumatic fall since your last visit: NO    Exam:    /74   Pulse 76    Wt 110 lb (49.9 kg)   Breastfeeding No   BMI 19.80 kg/m       Wt Readings from Last 5 Encounters:   02/06/20 110 lb (49.9 kg)   11/15/19 108 lb (49 kg)   07/22/19 109 lb 3.2 oz (49.5 kg)   04/08/19 107 lb 3.2 oz (48.6 kg)   09/14/18 113 lb (51.3 kg)     General Appearance: Alert, awake, cooperative, pleasant, NAD  Gait: spastic slow gait, somewhat spastic.  Attention Span:  Normal  Language/speech: no aphasia or dysarthria  Extraocular Movements:  Normal  Coordination: slow FNF  Facial Strength:  Normal  Tongue Strength:  Normal  Motor Exam: increased tone in lower extremities.  Normal bulk and strength 5/5 bilaterally.  DTRs: 2+ in upper ext, 4+ in patellae, sustained ankle clonus bilaterally     Assessment and Plan:     1. Focal epilepsy: the patient did not have any seizures since starting Depakote. Currently on Depakote 500 mg bid, no known side effects.     - Continue Depakote as before  - Obtain Depakote level   - RTC in 6-month    As described above, I met with the patient for 15 minutes and during this time counseling was greater than 50% of the visit time.  Alice Joshi MD

## 2020-02-20 ENCOUNTER — TRANSFERRED RECORDS (OUTPATIENT)
Dept: HEALTH INFORMATION MANAGEMENT | Facility: CLINIC | Age: 55
End: 2020-02-20

## 2020-02-20 LAB — TSH SERPL-ACNC: 0.02 UIU/ML (ref 0.45–4.5)

## 2020-02-28 ENCOUNTER — TELEPHONE (OUTPATIENT)
Dept: ENDOCRINOLOGY | Facility: CLINIC | Age: 55
End: 2020-02-28

## 2020-02-28 NOTE — TELEPHONE ENCOUNTER
Received faxed TSH result from [facility] ordered by Dr. Marrero.    2/20/20 TSH: 0.02 (range 0.45-4.50)    Last office visit with Dr. Herron was on 5/21/19. Patient is taking levothyroxine 112 mcg daily.      Next follow-up with Dr. Herron on 5/26/20.      Will send to Dr. Herron to review.       Gianna Bazzi RN  Endocrine Care Coordinator  [facility]

## 2020-03-02 NOTE — TELEPHONE ENCOUNTER
Contacted patient to review. Patient notes that Dr. Marrero (primary care provider) who ordered the lab, decreased her down to levothyroxine 100 mcg daily and she was going to start that. Patient wanted to know if Dr. Herron still wanted to have a phone visit.     Will review with Dr. Herron when she is in clinic Tuesday and contact patient with recommendations. Patient verbalizes understanding and agrees to plan.      Gianna Bazzi RN  Endocrine Care Coordinator  [facility]

## 2020-03-03 NOTE — TELEPHONE ENCOUNTER
Left voicemail for patient to contact our office.       Gianna Bazzi RN  Endocrine Care Coordinator  [facility]

## 2020-03-04 NOTE — TELEPHONE ENCOUNTER
Patient advised. Patient verbalizes understanding and agrees to plan.       Gianna Bazzi RN  Endocrine Care Coordinator  [facility]

## 2020-03-15 ENCOUNTER — HEALTH MAINTENANCE LETTER (OUTPATIENT)
Age: 55
End: 2020-03-15

## 2020-05-11 ENCOUNTER — TELEPHONE (OUTPATIENT)
Dept: ENDOCRINOLOGY | Facility: CLINIC | Age: 55
End: 2020-05-11

## 2020-05-11 NOTE — TELEPHONE ENCOUNTER
Patient informed and agreed with this plan. Patient did not have any additional questions.    Zoya Guillen, [facility]  Adult Endocrinology  [facility]

## 2020-05-11 NOTE — TELEPHONE ENCOUNTER
Patient is wondering if Dr. Herron would like her to have her thyroid labs checked prior to the phone visit on 5/13/20. Please advise and order labs if needed.    Zoya Guillen, [facility]  Adult Endocrinology  [facility]

## 2020-05-13 ENCOUNTER — VIRTUAL VISIT (OUTPATIENT)
Dept: ENDOCRINOLOGY | Facility: CLINIC | Age: 55
End: 2020-05-13
Payer: COMMERCIAL

## 2020-05-13 DIAGNOSIS — E06.3 HASHIMOTO'S THYROIDITIS: Primary | ICD-10-CM

## 2020-05-13 DIAGNOSIS — E28.319 ASYMPTOMATIC PREMATURE MENOPAUSE: ICD-10-CM

## 2020-05-13 DIAGNOSIS — R29.6 FALLS FREQUENTLY: ICD-10-CM

## 2020-05-13 DIAGNOSIS — Z78.0 POSTMENOPAUSAL STATUS: ICD-10-CM

## 2020-05-13 DIAGNOSIS — E55.9 VITAMIN D DEFICIENCY, UNSPECIFIED: ICD-10-CM

## 2020-05-13 PROCEDURE — 99214 OFFICE O/P EST MOD 30 MIN: CPT | Mod: TEL | Performed by: INTERNAL MEDICINE

## 2020-05-13 RX ORDER — LEVOTHYROXINE SODIUM 100 UG/1
1 TABLET ORAL DAILY
COMMUNITY
Start: 2020-05-01 | End: 2020-07-06

## 2020-05-13 NOTE — PROGRESS NOTES
"Sheela Davis is a 55 year old female who is being evaluated via a billable telephone visit.      The patient has been notified of following:     \"This telephone visit will be conducted via a call between you and your physician/provider. We have found that certain health care needs can be provided without the need for a physical exam.  This service lets us provide the care you need with a short phone conversation.  If a prescription is necessary we can send it directly to your pharmacy.  If lab work is needed we can place an order for that and you can then stop by our lab to have the test done at a later time.    Telephone visits are billed at different rates depending on your insurance coverage. During this emergency period, for some insurers they may be billed the same as an in-person visit.  Please reach out to your insurance provider with any questions.    If during the course of the call the physician/provider feels a telephone visit is not appropriate, you will not be charged for this service.\"    Patient has given verbal consent for Telephone visit?  Yes    What phone number would you like to be contacted at? [phone]    How would you like to obtain your AVS? [place]    Phone call duration: 24 minutes    The patient is seen in f/up for evaluation of hypothyroidism.     Sheela Davis is a 55 year old female with a past medical history of cerebellar ataxia and a family history of hypothyroidism, who was diagnosed with Hashimoto thyroiditis in August 2013. The thyroid ultrasound done in October 2013 revealed a heterogeneous, normal size thyroid gland.     Since her last visit here, the dose of levothyroxine was decreased in February this year, by her primary care provider, from 112 to 100 mcg daily.  Unfortunately, the lab work was not available for my review.    The patient reports being compliant in taking the levothyroxine on an empty stomach, a couple of hrs prior to breakfast, separate from " multivitamins and calcium supplements.     At her last appointment, I recommended to have a DEXA scan done through her primary care clinic.  She is telling me this was not scheduled.    She continues to fall ~ 3 times a week. Denies fractures.  She is not sure at what age she went through menopause, as she did not have any menstrual periods following the partial hysterectomy from 1994 and she never developed hot flashes.  She reports taking a calcium and vitamin D supplement.  She was not sure of the dose.  When she checked the pill bottle label, she realized that serving size is 630 mg calcium citrate and 500 units vitamin D, per 2 tablets.  She has only been taking 1 tablet daily.  In addition to the calcium and vitamin D supplement, she also takes a daily multivitamin, which contains 200 mg calcium and 1000 units vitamin D.  Over the years, she lost approximately a quarter of inch in height.    There is no family history of osteoporosis, kidney stones or hip fractures.  Of note that the patient does not like milk but she does have either cottage cheese or cheese, most of the days.    Past Medical History   Diagnosis Date     Headache      Depression      Urinary incontinence      Bowel obstruction surgeries - last 4/2014       Hepatitis A 1968     age 3   Cerebellar ataxia   Hashimoto thyroiditis  Raynaud syndrome  Seizure 2/2019     Past Surgical History:   Procedure Laterality Date     bowel obstruction surgery  2008 and 2009      SLING TRANSVAGINAL       SMALL BOWEL RESECTION  Jan/2013     thumb reconstruction     Partial hysterectomy in 1995     Current Medications  2000 units vitamin D daily    Current Outpatient Medications:      baclofen (LIORESAL) 20 MG tablet, Take 1 tablet (20 mg) by mouth 3 times daily, Disp: 90 tablet, Rfl: 2     Calcium Citrate-Vitamin D (CALCIUM CITRATE +D PO), Take by mouth daily , Disp: , Rfl:      divalproex sodium extended-release (DEPAKOTE ER) 500 MG 24 hr tablet, Take 500 mg  by mouth twice daily, Disp: 186 tablet, Rfl: 1     escitalopram (LEXAPRO) 20 MG tablet, Take 20 mg by mouth daily, Disp: , Rfl:      fish oil-omega-3 fatty acids 1000 MG capsule, Take 2 g by mouth daily, Disp: , Rfl:      levothyroxine (SYNTHROID/LEVOTHROID) 112 MCG tablet, Take 1 tablet (112 mcg) by mouth daily, Disp: 90 tablet, Rfl: 3     multivitamin, therapeutic with minerals (MULTI-VITAMIN) TABS, Take 1 tablet by mouth daily, Disp: , Rfl:      Naproxen Sodium (ALEVE PO), Take 220 mg by mouth as needed for moderate pain, Disp: , Rfl:      omeprazole (PRILOSEC) 10 MG CR capsule, Take 2 capsules (20 mg) by mouth daily Take by mouth 30-60 minutes before a meal., Disp: 30 capsule, Rfl: 0     PredniSONE 10 MG (48) TBPK, Take 10 mg by mouth See Admin Instructions Take 60mg x 3 days, 50mg x 3d, 40mg x 3d, 30mg x 3d, 20mg x 3d, 10mg x 3d, 5mg x 3d then stop., Disp: 60 each, Rfl: 0    Family History   Problem Relation Age of Onset     Hypothyroidism  Sisters      Psychiatry Son      ADHD     Neurological Daughter      migraines     Cardiovascular Mother      CHF     Diabetes in his 30's - type 1  Father      Diabetes dx in her late 50s - tx with insulin  Mother    2 sisters dx with insulin dependent diabetes. Many family members are overweight.     Social History   with 3 children. She denies smoking, drinking alcohol or using illicit drugs. Occupation: retired .    Review of Systems   Systemic:             Weight stable  Eye:                      No eye symptoms   Carlito-Laryngeal:     Dysphagia for both solid and liquid foods on and off for the last 20 years - stable, no hoarseness, no cough     Breast:                  No breast symptoms  Cardiovascular:    No cardiovascular symptoms, no CP or palpitations   Pulmonary:           No pulmonary symptoms, no SOB or cough    Gastrointestinal:   No N/V, some fecal incontinence    Genitourinary:       Urinary incontinence, no increased thirst  Endocrine:             cold intolerance - all her life; no hot flashes   Neurological:        headaches - 1-2 times a week, no tremor, no numbness or tingling sensation, episodes of lightheadedness - present intermittently, sometimes lasting for days; they have been present since her teen years; h/o falls due to diff maintaining her balance  Musculoskeletal:  No significant joint pain or muscle pain  Skin:                     No dry skin, no hair falling out   Psychological:     Depression - controlled, not suicidal               Vital Signs     Previous Weights:    Wt Readings from Last 10 Encounters:   02/06/20 49.9 kg (110 lb)   11/15/19 49 kg (108 lb)   07/22/19 49.5 kg (109 lb 3.2 oz)   04/08/19 48.6 kg (107 lb 3.2 oz)   09/14/18 51.3 kg (113 lb)   01/30/18 50.4 kg (111 lb 3 oz)   10/18/16 53 kg (116 lb 13.5 oz)   05/20/16 49.8 kg (109 lb 12.8 oz)   10/13/15 51.8 kg (114 lb 4 oz)   10/14/14 51 kg (112 lb 8 oz)        Objective:  Psych: Alert and oriented times 3; coherent speech, normal   rate and volume, able to articulate logical thoughts, able   to abstract reason, no tangential thoughts, no hallucinations   or delusions. Her affect is normal.    Lab Results  I reviewed prior lab results documented in EPIC.     TSH   Date Value Ref Range Status   05/21/2019 0.62 0.40 - 4.00 mU/L Final     T4 Free   Date Value Ref Range Status   05/21/2019 1.23 0.76 - 1.46 ng/dL Final     Assessment     1. Hashimoto thyroiditis   Clinically, she does not have definite signs or symptoms suggestive of hyper or hypothyroidism. We are going to recheck the thyroid hormone levels today and adjust the dose of levothyroxine accordingly.    2.  High risk of fracture, given recurrent falls  Advised to increase the calcium intake to 2 tablets BID.   Plan:  Schedule a screening DEXA scan  Check BMP, albumin, vitamin D and phosphorus level.     Orders Placed This Encounter   Procedures     Dexa hip/pelvis/spine     TSH     T4 free     Phosphorus     Basic  metabolic panel     Albumin level     25 Hydroxyvitamin D2 and D3

## 2020-07-01 ENCOUNTER — ANCILLARY PROCEDURE (OUTPATIENT)
Dept: BONE DENSITY | Facility: CLINIC | Age: 55
End: 2020-07-01
Attending: INTERNAL MEDICINE
Payer: COMMERCIAL

## 2020-07-01 DIAGNOSIS — Z78.0 POSTMENOPAUSAL STATUS: ICD-10-CM

## 2020-07-01 DIAGNOSIS — R29.6 FALLS FREQUENTLY: ICD-10-CM

## 2020-07-01 DIAGNOSIS — E55.9 VITAMIN D DEFICIENCY, UNSPECIFIED: ICD-10-CM

## 2020-07-01 DIAGNOSIS — E06.3 HASHIMOTO'S THYROIDITIS: ICD-10-CM

## 2020-07-01 LAB
ALBUMIN SERPL-MCNC: 3.1 G/DL (ref 3.4–5)
ANION GAP SERPL CALCULATED.3IONS-SCNC: 3 MMOL/L (ref 3–14)
BUN SERPL-MCNC: 19 MG/DL (ref 7–30)
CALCIUM SERPL-MCNC: 8.3 MG/DL (ref 8.5–10.1)
CHLORIDE SERPL-SCNC: 109 MMOL/L (ref 94–109)
CO2 SERPL-SCNC: 30 MMOL/L (ref 20–32)
CREAT SERPL-MCNC: 0.68 MG/DL (ref 0.52–1.04)
GFR SERPL CREATININE-BSD FRML MDRD: >90 ML/MIN/{1.73_M2}
GLUCOSE SERPL-MCNC: 89 MG/DL (ref 70–99)
PHOSPHATE SERPL-MCNC: 3.6 MG/DL (ref 2.5–4.5)
POTASSIUM SERPL-SCNC: 4.4 MMOL/L (ref 3.4–5.3)
SODIUM SERPL-SCNC: 142 MMOL/L (ref 133–144)
T4 FREE SERPL-MCNC: 1.26 NG/DL (ref 0.76–1.46)
TSH SERPL DL<=0.005 MIU/L-ACNC: 0.07 MU/L (ref 0.4–4)

## 2020-07-01 PROCEDURE — 77080 DXA BONE DENSITY AXIAL: CPT | Mod: XU | Performed by: RADIOLOGY

## 2020-07-01 PROCEDURE — 84439 ASSAY OF FREE THYROXINE: CPT | Performed by: INTERNAL MEDICINE

## 2020-07-01 PROCEDURE — 80069 RENAL FUNCTION PANEL: CPT | Performed by: INTERNAL MEDICINE

## 2020-07-01 PROCEDURE — 77081 DXA BONE DENSITY APPENDICULR: CPT | Mod: 59 | Performed by: RADIOLOGY

## 2020-07-01 PROCEDURE — 84443 ASSAY THYROID STIM HORMONE: CPT | Performed by: INTERNAL MEDICINE

## 2020-07-01 PROCEDURE — 82306 VITAMIN D 25 HYDROXY: CPT | Performed by: INTERNAL MEDICINE

## 2020-07-01 PROCEDURE — 36415 COLL VENOUS BLD VENIPUNCTURE: CPT | Performed by: INTERNAL MEDICINE

## 2020-07-03 LAB
DEPRECATED CALCIDIOL+CALCIFEROL SERPL-MC: <64 UG/L (ref 20–75)
VITAMIN D2 SERPL-MCNC: <5 UG/L
VITAMIN D3 SERPL-MCNC: 59 UG/L

## 2020-07-06 ENCOUNTER — TELEPHONE (OUTPATIENT)
Dept: ENDOCRINOLOGY | Facility: CLINIC | Age: 55
End: 2020-07-06

## 2020-07-06 DIAGNOSIS — M81.0 OSTEOPOROSIS OF MULTIPLE SITES: ICD-10-CM

## 2020-07-06 DIAGNOSIS — E06.3 HASHIMOTO'S THYROIDITIS: ICD-10-CM

## 2020-07-06 DIAGNOSIS — E55.9 VITAMIN D DEFICIENCY, UNSPECIFIED: Primary | ICD-10-CM

## 2020-07-06 RX ORDER — ALENDRONATE SODIUM 70 MG/1
70 TABLET ORAL
Qty: 12 TABLET | Refills: 3 | Status: SHIPPED | OUTPATIENT
Start: 2020-07-06 | End: 2021-06-03

## 2020-07-06 RX ORDER — IBUPROFEN 200 MG
1 CAPSULE ORAL 3 TIMES DAILY
Qty: 270 TABLET | Refills: 3 | Status: SHIPPED | OUTPATIENT
Start: 2020-07-06 | End: 2021-08-11

## 2020-07-06 RX ORDER — LEVOTHYROXINE SODIUM 100 UG/1
TABLET ORAL
Qty: 170 TABLET | Refills: 3 | Status: SHIPPED | OUTPATIENT
Start: 2020-07-06 | End: 2021-09-15

## 2020-07-06 NOTE — TELEPHONE ENCOUNTER
[facility] Call Center    Phone Message    May a detailed message be left on voicemail: yes     Reason for Call: Other: [facility] called looking for clarification on the directions and quantity for levothyroxine (SYNTHROID/LEVOTHROID) 100 MCG tablet . Please review and follow up     Action Taken: Message routed to:  [facility] ([facility] Endo    Travel Screening: Not Applicable

## 2020-07-06 NOTE — PROGRESS NOTES
Pt called with the results of the DXA scan which revealed osteoporosis at the hips and osteopenia at the spine.  I recommended to change the dose of calcium citrate to 950 mg (200 mg) 3 times a day and continue to take the daily multivitamin which contains 1000 units vitamin D daily.  The thyroid hormone levels are still elevated.  The patient just refilled the 100 mcg levothyroxine prescription.  She would prefer not to change the levothyroxine prescription.  I recommended to take 100 mcg levothyroxine daily for 5 days of the week and half a tablet, 1 day a week.  The plan is to recheck the thyroid hormone levels in 2 months.

## 2020-07-07 NOTE — TELEPHONE ENCOUNTER
[facility] patieint. Mixed message on  Orders on how to take. Pharmacy needs clarification. Neena Kenney RN on 7/7/2020 at 11:56 AM

## 2020-07-07 NOTE — TELEPHONE ENCOUNTER
Per 7/6/2020 Orders Only Encounter:  I recommended to take 100 mcg levothyroxine daily for 5 days of the week and half a tablet, 1 day a week. The plan is to recheck the thyroid hormone levels in 2 months.    Writer contacted Nicolette and clarified order.    Nilsa Lim LPN  Diabetes Clinic Coordinator   Adult Endocrinology and Pediatric Specialty Clinics  [facility]

## 2020-08-27 DIAGNOSIS — G40.109 FOCAL EPILEPSY (H): ICD-10-CM

## 2020-09-01 RX ORDER — DIVALPROEX SODIUM 500 MG/1
500 TABLET, EXTENDED RELEASE ORAL 2 TIMES DAILY
Qty: 186 TABLET | Refills: 0 | Status: SHIPPED | OUTPATIENT
Start: 2020-09-01 | End: 2020-12-01

## 2020-09-01 NOTE — TELEPHONE ENCOUNTER
Depakote Er refill requested.  Patient has a follow up scheduled  Refilled for 93 days per protocol

## 2020-09-14 ENCOUNTER — VIRTUAL VISIT (OUTPATIENT)
Dept: NEUROLOGY | Facility: CLINIC | Age: 55
End: 2020-09-14
Payer: COMMERCIAL

## 2020-09-14 DIAGNOSIS — G40.109 FOCAL EPILEPSY (H): Primary | ICD-10-CM

## 2020-09-14 NOTE — LETTER
2020       RE: Sheela Davis  : 1965   MRN: [id number]      Dear Colleague,    Thank you for referring your patient, Sheela Davis, to the [facility] EPILEPSY CARE at [facility]. Please see a copy of my visit note below.    Left 3 messages to call back to review a few questions before appointment. Not able to reach patient.     Again, thank you for allowing me to participate in the care of your patient.      Sincerely,    Alice Joshi MD

## 2020-09-14 NOTE — PROGRESS NOTES
Left 3 messages to call back to review a few questions before appointment. Not able to reach patient.

## 2020-09-24 ENCOUNTER — TELEPHONE (OUTPATIENT)
Dept: ENDOCRINOLOGY | Facility: CLINIC | Age: 55
End: 2020-09-24

## 2020-09-24 NOTE — TELEPHONE ENCOUNTER
Patient is requesting a call back from Dr. Herron's nurse. She has questions about a medication that was prescribed, and if she is supposed to continue taking it.    Patient would like a call back at home please.    Thank you.    [place]  [facility]   [phone]

## 2020-09-24 NOTE — TELEPHONE ENCOUNTER
Contacted patient to review. Patient notes that she started on alendronate 70 mg weekly back on 7/6/20 and patient is curious how long she needs to take it and when she has follow-up DEXA.      Reviewed notes.     Per Dr. Herron on 7/6/20:    Pt called with the results of the DXA scan which revealed osteoporosis at the hips and osteopenia at the spine.  I recommended to change the dose of calcium citrate to 950 mg (200 mg) 3 times a day and continue to take the daily multivitamin which contains 1000 units vitamin D daily.  The thyroid hormone levels are still elevated.  The patient just refilled the 100 mcg levothyroxine prescription.  She would prefer not to change the levothyroxine prescription.  I recommended to take 100 mcg levothyroxine daily for 5 days of the week and half a tablet, 1 day a week.  The plan is to recheck the thyroid hormone levels in 2 months.          Will send to Dr. Herron to determine follow-up plan.       Gianna Bazzi RN  Endocrine Care Coordinator  [facility]

## 2020-09-25 NOTE — TELEPHONE ENCOUNTER
Patient advised. Patient verbalizes understanding and agrees to plan.       Gianna Bazzi RN  Endocrine Care Coordinator  [facility]

## 2020-11-27 DIAGNOSIS — G40.109 FOCAL EPILEPSY (H): ICD-10-CM

## 2020-12-01 RX ORDER — DIVALPROEX SODIUM 500 MG/1
500 TABLET, EXTENDED RELEASE ORAL 2 TIMES DAILY
Qty: 186 TABLET | Refills: 11 | Status: SHIPPED | OUTPATIENT
Start: 2020-12-01 | End: 2021-12-17

## 2020-12-01 NOTE — TELEPHONE ENCOUNTER
Divalproex Sodium ER Oral Tablet Extended Release 24 Hour 500 MG    Last Written Prescription Date:  9/1/2020  Last Fill Quantity: 186,   # refills: 0  Last Office Visit : 2/6/2020  Future Office visit:  None  Routing refill request to provider for review/approval because:  Only a 90 day supply sent to pharm last order.  Pt asked to follow up from 2/6/2020 visit.  No follow up note in chart.  Provider tried virtual visit on 9/14/2020 with no answer.      Would Provider like to continue same dose of medication for Pt care?    Please advise for refills per Providers recommendations.    Thank you    Megan Mcdaniels RN  Central Triage Red Flags/Med Refills      Assessment and Plan: 2/6/2020     1. Focal epilepsy: the patient did not have any seizures since starting Depakote. Currently on Depakote 500 mg bid, no known side effects.      - Continue Depakote as before  - Obtain Depakote level   - RTC in 6-month     As described above, I met with the patient for 15 minutes and during this time counseling was greater than 50% of the visit time.  MD Megan Wilson RN  Central Triage Red Flags/Med Refills

## 2021-01-10 ENCOUNTER — HEALTH MAINTENANCE LETTER (OUTPATIENT)
Age: 56
End: 2021-01-10

## 2021-03-23 ENCOUNTER — TELEPHONE (OUTPATIENT)
Dept: NEUROLOGY | Facility: CLINIC | Age: 56
End: 2021-03-23

## 2021-03-23 NOTE — TELEPHONE ENCOUNTER
CAlled pt and left this information on her voice mail.     Health Call Center    Phone Message    May a detailed message be left on voicemail: yes     Reason for Call: Other: Pt called to request medical records to [facility].  Pt had tried to call Medical Records and did not understand instructions for how to request online.    Please call Pt back to advise.  Fax for [facility] [phone]    Action Taken: Message routed to:  [facility] ([facility] Neurology    Travel Screening: Not Applicable

## 2021-04-29 ENCOUNTER — NURSE TRIAGE (OUTPATIENT)
Dept: NEUROLOGY | Facility: CLINIC | Age: 56
End: 2021-04-29

## 2021-05-08 ENCOUNTER — HEALTH MAINTENANCE LETTER (OUTPATIENT)
Age: 56
End: 2021-05-08

## 2021-05-18 ENCOUNTER — VIRTUAL VISIT (OUTPATIENT)
Dept: ENDOCRINOLOGY | Facility: CLINIC | Age: 56
End: 2021-05-18
Payer: COMMERCIAL

## 2021-05-18 DIAGNOSIS — E06.3 HASHIMOTO'S THYROIDITIS: Primary | ICD-10-CM

## 2021-05-18 DIAGNOSIS — M81.0 OSTEOPOROSIS OF MULTIPLE SITES: ICD-10-CM

## 2021-05-18 PROCEDURE — 99214 OFFICE O/P EST MOD 30 MIN: CPT | Mod: 95 | Performed by: INTERNAL MEDICINE

## 2021-05-18 RX ORDER — ALENDRONATE SODIUM 70 MG/1
TABLET ORAL
COMMUNITY

## 2021-05-18 RX ORDER — UBIDECARENONE 30 MG
CAPSULE ORAL
COMMUNITY
Start: 2021-04-12

## 2021-05-18 RX ORDER — MULTIVIT WITH MINERALS/LUTEIN
1 TABLET ORAL DAILY
COMMUNITY

## 2021-05-18 NOTE — LETTER
5/18/2021         RE: Sheela Davis  [address] [place] [address]        Dear Colleague,    Thank you for referring your patient, Sheela Davis, to the [facility]. Please see a copy of my visit note below.    Sheela is a 56 year old who is being evaluated via a billable telephone visit.      What phone number would you like to be contacted at? [phone]  How would you like to obtain your AVS? Mail a copy      Due to the COVID 19 pandemic this visit was converted to a telephone visit in order to help prevent spread of infection in this patient and the general population.     Phone call start time: 3:48 pm   Phone call end time: 4:03 pm     The patient is seen in f/up for evaluation of hypothyroidism.     Sheela Davis is a 56 year old female with a past medical history of cerebellar ataxia and a family history of hypothyroidism, who was diagnosed with Hashimoto thyroiditis in August 2013. The thyroid ultrasound done in October 2013 revealed a heterogeneous, normal size thyroid gland.     The patient reports being compliant in taking the levothyroxine on an empty stomach, a couple of hrs prior to breakfast.  She has been trying to gradually taper down the dose of Lexapro.  Her neurologist started her on carbidopa-levodopa.  She continues to fall on a daily basis, as she has a hard time maintaining her balance.  Denies fractures.    The DXA scan from 7/2020 revealed an L1-L4 T score of -1.6, left femoral neck T score -3.2, right femoral neck T score -3. 33% distal radius T score 0.1.  Subsequently, the patient was started on treatment with Fosamax in July 2020.  She reports being compliant in taking it as instructed.  She also confirms she has been taking 20 mcg vitamin D daily and 950 mg calcium citrate, 3 times daily.  In terms of dairy products, she does have a serving a day (generally cheese or cottage cheese, as she does not like milk).    Sheela is not  sure at what age she went through menopause, as she did not have any menstrual periods following the partial hysterectomy from 1994 and she never developed hot flashes.    There is no family history of osteoporosis, kidney stones or hip fractures.    Past Medical History   Diagnosis Date     Headache      Depression      Urinary incontinence      Bowel obstruction surgeries - last 4/2014       Hepatitis A 1968     age 3   Cerebellar ataxia   Hashimoto thyroiditis  Raynaud syndrome  Seizure 2/2019     Past Surgical History:   Procedure Laterality Date     bowel obstruction surgery  2008 and 2009      SLING TRANSVAGINAL       SMALL BOWEL RESECTION  Jan/2013     thumb reconstruction     Partial hysterectomy in 1995     Current Medications  2000 units vitamin D daily    Current Outpatient Medications:      alendronate (FOSAMAX) 70 MG tablet, Take 1 tablet (70 mg) by mouth every 7 days Take 60 minutes before am meal with 8 oz. water. Remain upright for 30 minutes., Disp: , Rfl:      alendronate (FOSAMAX) 70 MG tablet, Take 1 tablet (70 mg) by mouth every 7 days Take 60 minutes before am meal with 8 oz. water. Remain upright for 30 minutes., Disp: 12 tablet, Rfl: 3     calcium citrate (CITRACAL) 950 MG tablet, Take 1 tablet (950 mg) by mouth 3 times daily, Disp: 270 tablet, Rfl: 3     Calcium Citrate-Vitamin D (CALCIUM CITRATE +D PO), Take by mouth daily , Disp: , Rfl:      coenzyme Q-10 capsule, TAKE THREE CAPSULES BY MOUTH EVERY MORNING, Disp: , Rfl:      divalproex sodium extended-release (DEPAKOTE ER) 500 MG 24 hr tablet, Take 1 tablet (500 mg) by mouth 2 times daily, Disp: 186 tablet, Rfl: 11     fish oil-omega-3 fatty acids 1000 MG capsule, Take 2 g by mouth daily, Disp: , Rfl:      levothyroxine (SYNTHROID/LEVOTHROID) 100 MCG tablet, Take one tablet daily, 5 days a week, and 1/2 tablet, one day a week., Disp: 170 tablet, Rfl: 3     multivitamin (CENTRUM SILVER) tablet, Take 1 tablet by mouth daily, Disp: , Rfl:       multivitamin, therapeutic with minerals (MULTI-VITAMIN) TABS, Take 1 tablet by mouth daily, Disp: , Rfl:      Naproxen Sodium (ALEVE PO), Take 220 mg by mouth as needed for moderate pain, Disp: , Rfl:      Omega-3 Fatty Acids (FISH OIL PO), , Disp: , Rfl:      baclofen (LIORESAL) 20 MG tablet, Take 1 tablet (20 mg) by mouth 3 times daily (Patient not taking: Reported on 5/13/2020), Disp: 90 tablet, Rfl: 2     escitalopram (LEXAPRO) 20 MG tablet, Take 20 mg by mouth daily, Disp: , Rfl:      omeprazole (PRILOSEC) 10 MG CR capsule, Take 2 capsules (20 mg) by mouth daily Take by mouth 30-60 minutes before a meal. (Patient not taking: Reported on 5/18/2021), Disp: 30 capsule, Rfl: 0    Family History   Problem Relation Age of Onset     Hypothyroidism  Sisters      Psychiatry Son      ADHD     Neurological Daughter      migraines     Cardiovascular Mother      CHF     Diabetes in his 30's - type 1  Father      Diabetes dx in her late 50s - tx with insulin  Mother    2 sisters dx with insulin dependent diabetes. Many family members are overweight.     Social History   with 3 children. She denies smoking, drinking alcohol or using illicit drugs. Occupation: retired .    Review of Systems   Systemic:             Weight stable  Eye:                      No eye symptoms   Carlito-Laryngeal:     Dysphagia for both solid and liquid foods on and off for the last 20 years - stable, no hoarseness, no cough     Breast:                  No breast symptoms  Cardiovascular:    No cardiovascular symptoms, no CP or palpitations   Pulmonary:           No pulmonary symptoms, no SOB or cough    Gastrointestinal:   No N/V, mild fecal incontinence, no constipation or diarrhea   Genitourinary:       Urinary incontinence, no increased thirst  Endocrine:            cold intolerance - all her life; no hot flashes   Neurological:        no headaches, no tremor, no numbness or tingling sensation; h/o falls due to diff maintaining her  balance  Musculoskeletal:  No significant joint pain or muscle pain  Skin:                     No dry skin, no hair falling out   Psychological:     Depression - controlled              Vital Signs     Previous Weights:    Wt Readings from Last 10 Encounters:   02/06/20 49.9 kg (110 lb)   11/15/19 49 kg (108 lb)   07/22/19 49.5 kg (109 lb 3.2 oz)   04/08/19 48.6 kg (107 lb 3.2 oz)   09/14/18 51.3 kg (113 lb)   01/30/18 50.4 kg (111 lb 3 oz)   10/18/16 53 kg (116 lb 13.5 oz)   05/20/16 49.8 kg (109 lb 12.8 oz)   10/13/15 51.8 kg (114 lb 4 oz)   10/14/14 51 kg (112 lb 8 oz)        Objective:  Psych: Alert and oriented times 3; coherent speech, normal rate and volume. The affect is normal.    Lab Results  I reviewed prior lab results documented in EPIC.     TSH   Date Value Ref Range Status   07/01/2020 0.07 (L) 0.40 - 4.00 mU/L Final     T4 Free   Date Value Ref Range Status   07/01/2020 1.26 0.76 - 1.46 ng/dL Final     Assessment     1. Hashimoto thyroiditis   Clinically, she does not have definite signs or symptoms suggestive of hyper or hypothyroidism. We are going to recheck the thyroid hormone levels and adjust the dose of levothyroxine accordingly.    2.  Osteoporosis at the hips, osteopenia of the spine, diagnosed in 2020, when the patient was started on treatment with Fosamax.  There is a high risk of fracture, given recurrent falls.  Plan:  Schedule a screening DEXA scan in July 2022  Check BMP, albumin, vitamin D and phosphorus level.   Continue to take the calcium and vitamin D supplements and have one serving of dairy products daily.    Orders Placed This Encounter   Procedures     Dexa hip/pelvis/spine     Dexa Wrist/Heel     TSH     T4 free     Albumin level     Phosphorus     25 Hydroxyvitamin D2 and D3     Parathyroid Hormone Intact     Basic metabolic panel                    Again, thank you for allowing me to participate in the care of your patient.        Sincerely,        Megan Herron,  MD

## 2021-05-18 NOTE — PROGRESS NOTES
Sheela is a 56 year old who is being evaluated via a billable telephone visit.      What phone number would you like to be contacted at? [phone]  How would you like to obtain your AVS? Mail a copy

## 2021-05-18 NOTE — PROGRESS NOTES
Due to the COVID 19 pandemic this visit was converted to a telephone visit in order to help prevent spread of infection in this patient and the general population.     Phone call start time: 3:48 pm   Phone call end time: 4:03 pm     The patient is seen in f/up for evaluation of hypothyroidism.     Sheela Davis is a 56 year old female with a past medical history of cerebellar ataxia and a family history of hypothyroidism, who was diagnosed with Hashimoto thyroiditis in August 2013. The thyroid ultrasound done in October 2013 revealed a heterogeneous, normal size thyroid gland.     The patient reports being compliant in taking the levothyroxine on an empty stomach, a couple of hrs prior to breakfast.  She has been trying to gradually taper down the dose of Lexapro.  Her neurologist started her on carbidopa-levodopa.  She continues to fall on a daily basis, as she has a hard time maintaining her balance.  Denies fractures.    The DXA scan from 7/2020 revealed an L1-L4 T score of -1.6, left femoral neck T score -3.2, right femoral neck T score -3. 33% distal radius T score 0.1.  Subsequently, the patient was started on treatment with Fosamax in July 2020.  She reports being compliant in taking it as instructed.  She also confirms she has been taking 20 mcg vitamin D daily and 950 mg calcium citrate, 3 times daily.  In terms of dairy products, she does have a serving a day (generally cheese or cottage cheese, as she does not like milk).    Sheela is not sure at what age she went through menopause, as she did not have any menstrual periods following the partial hysterectomy from 1994 and she never developed hot flashes.    There is no family history of osteoporosis, kidney stones or hip fractures.    Past Medical History   Diagnosis Date     Headache      Depression      Urinary incontinence      Bowel obstruction surgeries - last 4/2014       Hepatitis A 1968     age 3   Cerebellar ataxia   Hashimoto  thyroiditis  Raynaud syndrome  Seizure 2/2019     Past Surgical History:   Procedure Laterality Date     bowel obstruction surgery  2008 and 2009      SLING TRANSVAGINAL       SMALL BOWEL RESECTION  Jan/2013     thumb reconstruction     Partial hysterectomy in 1995     Current Medications  2000 units vitamin D daily    Current Outpatient Medications:      alendronate (FOSAMAX) 70 MG tablet, Take 1 tablet (70 mg) by mouth every 7 days Take 60 minutes before am meal with 8 oz. water. Remain upright for 30 minutes., Disp: , Rfl:      alendronate (FOSAMAX) 70 MG tablet, Take 1 tablet (70 mg) by mouth every 7 days Take 60 minutes before am meal with 8 oz. water. Remain upright for 30 minutes., Disp: 12 tablet, Rfl: 3     calcium citrate (CITRACAL) 950 MG tablet, Take 1 tablet (950 mg) by mouth 3 times daily, Disp: 270 tablet, Rfl: 3     Calcium Citrate-Vitamin D (CALCIUM CITRATE +D PO), Take by mouth daily , Disp: , Rfl:      coenzyme Q-10 capsule, TAKE THREE CAPSULES BY MOUTH EVERY MORNING, Disp: , Rfl:      divalproex sodium extended-release (DEPAKOTE ER) 500 MG 24 hr tablet, Take 1 tablet (500 mg) by mouth 2 times daily, Disp: 186 tablet, Rfl: 11     fish oil-omega-3 fatty acids 1000 MG capsule, Take 2 g by mouth daily, Disp: , Rfl:      levothyroxine (SYNTHROID/LEVOTHROID) 100 MCG tablet, Take one tablet daily, 5 days a week, and 1/2 tablet, one day a week., Disp: 170 tablet, Rfl: 3     multivitamin (CENTRUM SILVER) tablet, Take 1 tablet by mouth daily, Disp: , Rfl:      multivitamin, therapeutic with minerals (MULTI-VITAMIN) TABS, Take 1 tablet by mouth daily, Disp: , Rfl:      Naproxen Sodium (ALEVE PO), Take 220 mg by mouth as needed for moderate pain, Disp: , Rfl:      Omega-3 Fatty Acids (FISH OIL PO), , Disp: , Rfl:      baclofen (LIORESAL) 20 MG tablet, Take 1 tablet (20 mg) by mouth 3 times daily (Patient not taking: Reported on 5/13/2020), Disp: 90 tablet, Rfl: 2     escitalopram (LEXAPRO) 20 MG tablet, Take  20 mg by mouth daily, Disp: , Rfl:      omeprazole (PRILOSEC) 10 MG CR capsule, Take 2 capsules (20 mg) by mouth daily Take by mouth 30-60 minutes before a meal. (Patient not taking: Reported on 5/18/2021), Disp: 30 capsule, Rfl: 0    Family History   Problem Relation Age of Onset     Hypothyroidism  Sisters      Psychiatry Son      ADHD     Neurological Daughter      migraines     Cardiovascular Mother      CHF     Diabetes in his 30's - type 1  Father      Diabetes dx in her late 50s - tx with insulin  Mother    2 sisters dx with insulin dependent diabetes. Many family members are overweight.     Social History   with 3 children. She denies smoking, drinking alcohol or using illicit drugs. Occupation: retired .    Review of Systems   Systemic:             Weight stable  Eye:                      No eye symptoms   Carlito-Laryngeal:     Dysphagia for both solid and liquid foods on and off for the last 20 years - stable, no hoarseness, no cough     Breast:                  No breast symptoms  Cardiovascular:    No cardiovascular symptoms, no CP or palpitations   Pulmonary:           No pulmonary symptoms, no SOB or cough    Gastrointestinal:   No N/V, mild fecal incontinence, no constipation or diarrhea   Genitourinary:       Urinary incontinence, no increased thirst  Endocrine:            cold intolerance - all her life; no hot flashes   Neurological:        no headaches, no tremor, no numbness or tingling sensation; h/o falls due to diff maintaining her balance  Musculoskeletal:  No significant joint pain or muscle pain  Skin:                     No dry skin, no hair falling out   Psychological:     Depression - controlled              Vital Signs     Previous Weights:    Wt Readings from Last 10 Encounters:   02/06/20 49.9 kg (110 lb)   11/15/19 49 kg (108 lb)   07/22/19 49.5 kg (109 lb 3.2 oz)   04/08/19 48.6 kg (107 lb 3.2 oz)   09/14/18 51.3 kg (113 lb)   01/30/18 50.4 kg (111 lb 3 oz)   10/18/16 53  kg (116 lb 13.5 oz)   05/20/16 49.8 kg (109 lb 12.8 oz)   10/13/15 51.8 kg (114 lb 4 oz)   10/14/14 51 kg (112 lb 8 oz)        Objective:  Psych: Alert and oriented times 3; coherent speech, normal rate and volume. The affect is normal.    Lab Results  I reviewed prior lab results documented in EPIC.     TSH   Date Value Ref Range Status   07/01/2020 0.07 (L) 0.40 - 4.00 mU/L Final     T4 Free   Date Value Ref Range Status   07/01/2020 1.26 0.76 - 1.46 ng/dL Final     Assessment     1. Hashimoto thyroiditis   Clinically, she does not have definite signs or symptoms suggestive of hyper or hypothyroidism. We are going to recheck the thyroid hormone levels and adjust the dose of levothyroxine accordingly.    2.  Osteoporosis at the hips, osteopenia of the spine, diagnosed in 2020, when the patient was started on treatment with Fosamax.  There is a high risk of fracture, given recurrent falls.  Plan:  Schedule a screening DEXA scan in July 2022  Check BMP, albumin, vitamin D and phosphorus level.   Continue to take the calcium and vitamin D supplements and have one serving of dairy products daily.    Orders Placed This Encounter   Procedures     Dexa hip/pelvis/spine     Dexa Wrist/Heel     TSH     T4 free     Albumin level     Phosphorus     25 Hydroxyvitamin D2 and D3     Parathyroid Hormone Intact     Basic metabolic panel

## 2021-05-29 DIAGNOSIS — M81.0 OSTEOPOROSIS OF MULTIPLE SITES: Primary | ICD-10-CM

## 2021-06-03 RX ORDER — ALENDRONATE SODIUM 70 MG/1
70 TABLET ORAL
Qty: 12 TABLET | Refills: 3 | Status: SHIPPED | OUTPATIENT
Start: 2021-06-03 | End: 2022-05-06

## 2021-08-10 DIAGNOSIS — E55.9 VITAMIN D DEFICIENCY, UNSPECIFIED: ICD-10-CM

## 2021-08-11 NOTE — TELEPHONE ENCOUNTER
calcium citrate (CITRACAL) 950 (200 Ca) MG tablet  Last Written Prescription Date:  ?  Last Fill Quantity: ?,   # refills: ?  Last Office Visit : 9/14/2020  Future Office visit:  None    Routing refill request to provider for review/approval because:  Medication is reported/historical      Megan Mcdaniels RN  Central Triage Red Flags/Med Refills

## 2021-08-12 RX ORDER — IBUPROFEN 200 MG
1 CAPSULE ORAL 3 TIMES DAILY
Qty: 270 TABLET | Refills: 3 | Status: SHIPPED | OUTPATIENT
Start: 2021-08-12 | End: 2022-10-29

## 2021-09-14 DIAGNOSIS — E06.3 HASHIMOTO'S THYROIDITIS: ICD-10-CM

## 2021-09-15 NOTE — TELEPHONE ENCOUNTER
9/15 Called and spoke to patient. She is currently scheduled for follow up appointment.     Jailyn naik Procedure   Orthopedics, Podiatry, Sports Medicine, ENT/Eye Specialties  [facility]   [phone]

## 2021-09-15 NOTE — TELEPHONE ENCOUNTER
levothyroxine (SYNTHROID/LEVOTHROID) 100 MCG tablet      Last Written Prescription Date:  7/6/20  Last Fill Quantity: 170,   # refills: 3  Last Office Visit : 5/18/21 recommended 1 year follow up  Future Office visit:  None scheduled    Routing refill request to provider for review/approval because:  Overdue/abnormal TSH  Lab Test 07/01/20  1201   TSH 0.07*     Nothing more recent in care everywhere nor media  Future orders in queue

## 2021-09-16 NOTE — TELEPHONE ENCOUNTER
levothyroxine (SYNTHROID/LEVOTHROID) 100 MCG tablet    Take one tablet daily, 5 days a week, and 1/2 tablet, one day a week.    Last Written Prescription Date:  7/6/20  Last Fill Quantity: 170,   # refills: 3  Last Office Visit : 5/8/21  Future Office visit:  11/24/21    Routing refill request to provider for review/approval because:    Overdue lab - TSH.  Future Lab order(s) in que.   Lab Test 07/01/20  1201   TSH 0.07*

## 2021-09-17 RX ORDER — LEVOTHYROXINE SODIUM 100 UG/1
TABLET ORAL
Qty: 60 TABLET | Refills: 0 | Status: SHIPPED | OUTPATIENT
Start: 2021-09-17 | End: 2021-11-24

## 2021-10-23 ENCOUNTER — HEALTH MAINTENANCE LETTER (OUTPATIENT)
Age: 56
End: 2021-10-23

## 2021-11-24 ENCOUNTER — OFFICE VISIT (OUTPATIENT)
Dept: ENDOCRINOLOGY | Facility: CLINIC | Age: 56
End: 2021-11-24
Payer: COMMERCIAL

## 2021-11-24 VITALS
HEART RATE: 72 BPM | DIASTOLIC BLOOD PRESSURE: 78 MMHG | SYSTOLIC BLOOD PRESSURE: 119 MMHG | WEIGHT: 104.5 LBS | OXYGEN SATURATION: 98 % | BODY MASS INDEX: 18.81 KG/M2

## 2021-11-24 DIAGNOSIS — E06.3 HASHIMOTO'S THYROIDITIS: Primary | ICD-10-CM

## 2021-11-24 DIAGNOSIS — M81.0 OSTEOPOROSIS OF MULTIPLE SITES: ICD-10-CM

## 2021-11-24 LAB
T4 FREE SERPL-MCNC: 1.16 NG/DL (ref 0.76–1.46)
TSH SERPL DL<=0.005 MIU/L-ACNC: 1.61 MU/L (ref 0.4–4)

## 2021-11-24 PROCEDURE — 84439 ASSAY OF FREE THYROXINE: CPT | Performed by: INTERNAL MEDICINE

## 2021-11-24 PROCEDURE — 84443 ASSAY THYROID STIM HORMONE: CPT | Performed by: INTERNAL MEDICINE

## 2021-11-24 PROCEDURE — 99214 OFFICE O/P EST MOD 30 MIN: CPT | Performed by: INTERNAL MEDICINE

## 2021-11-24 PROCEDURE — 36415 COLL VENOUS BLD VENIPUNCTURE: CPT | Performed by: INTERNAL MEDICINE

## 2021-11-24 RX ORDER — LEVOTHYROXINE SODIUM 100 UG/1
TABLET ORAL
Qty: 60 TABLET | Refills: 3 | Status: SHIPPED | OUTPATIENT
Start: 2021-11-24 | End: 2022-11-08

## 2021-11-24 NOTE — LETTER
11/24/2021         RE: Sheela Davis  [address] [facility] [place] [facility] [id number]        Dear Colleague,    Thank you for referring your patient, Sheela Davis, to the [facility]. Please see a copy of my visit note below.      The patient is seen in f/up for evaluation of hypothyroidism and osteoporosis.      Sheela Davis is a 56 year old female with a past medical history of cerebellar ataxia and a family history of hypothyroidism, who was diagnosed with Hashimoto thyroiditis in August 2013. The thyroid ultrasound done in October 2013 revealed a heterogeneous, normal size thyroid gland.     The patient reports being compliant in taking the levothyroxine as instructed: 1 tablet daily, 5 days a week and half a tablet, 1 day a week. The day when she takes the Fosamax, she does not take the levothyroxine.  She continues to fall frequently, at least a couple of times a week, as she has a hard time maintaining her balance.  Denies fractures.    The DXA scan from 7/2020 revealed an L1-L4 T score of -1.6, left femoral neck T score -3.2, right femoral neck T score -3. 33% distal radius T score 0.1.  Subsequently, the patient was started on treatment with Fosamax in July 2020.  She reports being compliant in taking it as instructed.  She also confirms she has been taking 25 mcg vitamin D daily, 950 mg calcium citrate, 3 times daily and a daily MVI.  In terms of dairy products, she does have almost one serving of cheese daily.   She does not like milk.   Sheela is not sure at what age she went through menopause, as she did not have any menstrual periods following the partial hysterectomy from 1994 and she never developed hot flashes.    There is no family history of osteoporosis, kidney stones or hip fractures.    Her primary care provider prescribed Lexapro, 1 month ago. The dose was recently increased. The patient continues to struggle with depression. She has not been crying as  much since taking Lexapro. Her weight is down 6 pounds in the last year and the patient attributes the weight loss to decreased appetite. The food is also less palatable. She has a hard time sleeping at night (she only sleeps 4 or 5 hours) and she takes naps during daytime.       Past Medical History   Diagnosis Date     Headache      Depression      Urinary incontinence      Bowel obstruction surgeries - last 4/2014       Hepatitis A 1968     age 3   Cerebellar ataxia   Hashimoto thyroiditis  Raynaud syndrome  Seizure 2/2019     Past Surgical History:   Procedure Laterality Date     bowel obstruction surgery  2008 and 2009      SLING TRANSVAGINAL       SMALL BOWEL RESECTION  Jan/2013     thumb reconstruction     Partial hysterectomy in 1995     Current Medications  Current Outpatient Medications:      alendronate (FOSAMAX) 70 MG tablet, Take 1 tablet (70 mg) by mouth every 7 days Take 60 minutes before am meal with 8 oz. water. Remain upright for 30 minutes., Disp: 12 tablet, Rfl: 3     alendronate (FOSAMAX) 70 MG tablet, Take 1 tablet (70 mg) by mouth every 7 days Take 60 minutes before am meal with 8 oz. water. Remain upright for 30 minutes., Disp: , Rfl:      baclofen (LIORESAL) 20 MG tablet, Take 1 tablet (20 mg) by mouth 3 times daily, Disp: 90 tablet, Rfl: 2     calcium citrate (CITRACAL) 950 (200 Ca) MG tablet, Take 1 tablet (950 mg) by mouth 3 times daily, Disp: 270 tablet, Rfl: 3     Calcium Citrate-Vitamin D (CALCIUM CITRATE +D PO), Take by mouth daily , Disp: , Rfl:      coenzyme Q-10 capsule, TAKE THREE CAPSULES BY MOUTH EVERY MORNING, Disp: , Rfl:      divalproex sodium extended-release (DEPAKOTE ER) 500 MG 24 hr tablet, Take 1 tablet (500 mg) by mouth 2 times daily, Disp: 186 tablet, Rfl: 11     escitalopram (LEXAPRO) 20 MG tablet, Take 20 mg by mouth daily, Disp: , Rfl:      fish oil-omega-3 fatty acids 1000 MG capsule, Take 2 g by mouth daily, Disp: , Rfl:      levothyroxine (SYNTHROID/LEVOTHROID)  100 MCG tablet, Take one tablet daily, 5 days a week, and 1/2 tablet, one day a week.  Overdue for labs, Disp: 60 tablet, Rfl: 0     multivitamin (CENTRUM SILVER) tablet, Take 1 tablet by mouth daily, Disp: , Rfl:      multivitamin, therapeutic with minerals (MULTI-VITAMIN) TABS, Take 1 tablet by mouth daily, Disp: , Rfl:      Naproxen Sodium (ALEVE PO), Take 220 mg by mouth as needed for moderate pain, Disp: , Rfl:      Omega-3 Fatty Acids (FISH OIL PO), , Disp: , Rfl:      omeprazole (PRILOSEC) 10 MG CR capsule, Take 2 capsules (20 mg) by mouth daily Take by mouth 30-60 minutes before a meal., Disp: 30 capsule, Rfl: 0    Family History   Problem Relation Age of Onset     Hypothyroidism  Sisters      Psychiatry Son      ADHD     Neurological Daughter      migraines     Cardiovascular Mother      CHF     Diabetes in his 30's - type 1  Father      Diabetes dx in her late 50s - tx with insulin  Mother    2 sisters dx with insulin dependent diabetes. Many family members are overweight.     Social History   with 3 children. She denies smoking, drinking alcohol or using illicit drugs. Occupation: retired .    Review of Systems   Systemic:             As above   Eye:                      No eye symptoms   Carlito-Laryngeal:     Dysphagia for both solid and liquid foods on and off for the last 20 years - stable, no hoarseness, no cough     Breast:                  No breast symptoms  Cardiovascular:    No cardiovascular symptoms, no CP or palpitations   Pulmonary:           No pulmonary symptoms, no SOB or cough    Gastrointestinal:   No N/V, takes a stool softener; some fecal incontinence    Genitourinary:       Urinary incontinence, no increased thirst  Endocrine:            cold intolerance - all her life; no hot flashes   Neurological:        no headaches, no tremor, no numbness or tingling sensation; h/o falls due to diff maintaining her balance  Musculoskeletal:  No significant joint pain or muscle  pain  Skin:                     No dry skin, no hair falling out   Psychological:     Depression -  As above               Vital Signs     Previous Weights:    Wt Readings from Last 10 Encounters:   11/24/21 47.4 kg (104 lb 8 oz)   02/06/20 49.9 kg (110 lb)   11/15/19 49 kg (108 lb)   07/22/19 49.5 kg (109 lb 3.2 oz)   04/08/19 48.6 kg (107 lb 3.2 oz)   09/14/18 51.3 kg (113 lb)   01/30/18 50.4 kg (111 lb 3 oz)   10/18/16 53 kg (116 lb 13.5 oz)   05/20/16 49.8 kg (109 lb 12.8 oz)   10/13/15 51.8 kg (114 lb 4 oz)        /78 (BP Location: Left arm, Patient Position: Chair, Cuff Size: Adult Regular)   Pulse 72   Wt 47.4 kg (104 lb 8 oz)   SpO2 98%   BMI 18.81 kg/m       General appearance: Thin, appears tired  Eyes: conjunctivae and extraocular motions are normal. Pupils are equal, round, and reactive to light. No lid lag, no stare.  HENT: oropharynx clear and moist; neck no JVD, no bruits, no thyromegaly, no palpable nodules  Cardiovascular: regular rhythm, no murmurs, distal pulses palpable, no edema  Respiratory: chest clear, no rales, no rhonchi  Neurologic: reflexes hyperactive, no resting tremor, gait spastic. Transitions with difficulty to the exam table.  Musculoskeletal: Increased tone  Psychiatric: affect depressed, judgment normal   Skin: warm, no lesions    Lab Results  I reviewed prior lab results documented in EPIC.     TSH   Date Value Ref Range Status   07/01/2020 0.07 (L) 0.40 - 4.00 mU/L Final     T4 Free   Date Value Ref Range Status   07/01/2020 1.26 0.76 - 1.46 ng/dL Final     Assessment     1. Hashimoto thyroiditis   Clinically, she does not have definite signs or symptoms suggestive of hyper or hypothyroidism. We are going to recheck the thyroid hormone levels and adjust the dose of levothyroxine accordingly.    2.  Osteoporosis at the hips, osteopenia of the spine, diagnosed in 2020, when the patient was started on treatment with Fosamax. She has been tolerating the Fosamax with no  side effects. There is a high risk of fracture, given recurrent falls.  Plan:  Schedule a screening DEXA scan in 2022  Check BMP, albumin, vitamin D and phosphorus level.   Continue to take the calcium and vitamin D supplements and have one serving of dairy products daily.    Orders Placed This Encounter   Procedures     Dexa hip/pelvis/spine     Dexa Wrist/Heel     TSH     T4 free     25 Hydroxyvitamin D2 and D3     Albumin level     Basic metabolic panel     32 minutes spent on the date of the encounter doing chart review, history and exam, documentation and further activities as noted above.                   Again, thank you for allowing me to participate in the care of your patient.        Sincerely,        Megan Herron MD

## 2021-11-24 NOTE — NURSING NOTE
Sheela Davis's goals for this visit include:   Chief Complaint   Patient presents with     Follow Up     Thyroid Disease       She requests these members of her care team be copied on today's visit information: yes    PCP: Alfredo Marrero    Referring Provider:  No referring provider defined for this encounter.    /78 (BP Location: Left arm, Patient Position: Chair, Cuff Size: Adult Regular)   Pulse 72   Wt 47.4 kg (104 lb 8 oz)   SpO2 98%   BMI 18.81 kg/m      Do you need any medication refills at today's visit? Yes    Yolanda TURPIN MA   Adult Endocrine   [facility]

## 2021-11-24 NOTE — PROGRESS NOTES
The patient is seen in f/up for evaluation of hypothyroidism and osteoporosis.      Sheela Davis is a 56 year old female with a past medical history of cerebellar ataxia and a family history of hypothyroidism, who was diagnosed with Hashimoto thyroiditis in August 2013. The thyroid ultrasound done in October 2013 revealed a heterogeneous, normal size thyroid gland.     The patient reports being compliant in taking the levothyroxine as instructed: 1 tablet daily, 5 days a week and half a tablet, 1 day a week. The day when she takes the Fosamax, she does not take the levothyroxine.  She continues to fall frequently, at least a couple of times a week, as she has a hard time maintaining her balance.  Denies fractures.    The DXA scan from 7/2020 revealed an L1-L4 T score of -1.6, left femoral neck T score -3.2, right femoral neck T score -3. 33% distal radius T score 0.1.  Subsequently, the patient was started on treatment with Fosamax in July 2020.  She reports being compliant in taking it as instructed.  She also confirms she has been taking 25 mcg vitamin D daily, 950 mg calcium citrate, 3 times daily and a daily MVI.  In terms of dairy products, she does have almost one serving of cheese daily.   She does not like milk.   Sheela is not sure at what age she went through menopause, as she did not have any menstrual periods following the partial hysterectomy from 1994 and she never developed hot flashes.    There is no family history of osteoporosis, kidney stones or hip fractures.    Her primary care provider prescribed Lexapro, 1 month ago. The dose was recently increased. The patient continues to struggle with depression. She has not been crying as much since taking Lexapro. Her weight is down 6 pounds in the last year and the patient attributes the weight loss to decreased appetite. The food is also less palatable. She has a hard time sleeping at night (she only sleeps 4 or 5 hours) and she takes naps during  daytime.       Past Medical History   Diagnosis Date     Headache      Depression      Urinary incontinence      Bowel obstruction surgeries - last 4/2014       Hepatitis A 1968     age 3   Cerebellar ataxia   Hashimoto thyroiditis  Raynaud syndrome  Seizure 2/2019     Past Surgical History:   Procedure Laterality Date     bowel obstruction surgery  2008 and 2009      SLING TRANSVAGINAL       SMALL BOWEL RESECTION  Jan/2013     thumb reconstruction     Partial hysterectomy in 1995     Current Medications  Current Outpatient Medications:      alendronate (FOSAMAX) 70 MG tablet, Take 1 tablet (70 mg) by mouth every 7 days Take 60 minutes before am meal with 8 oz. water. Remain upright for 30 minutes., Disp: 12 tablet, Rfl: 3     alendronate (FOSAMAX) 70 MG tablet, Take 1 tablet (70 mg) by mouth every 7 days Take 60 minutes before am meal with 8 oz. water. Remain upright for 30 minutes., Disp: , Rfl:      baclofen (LIORESAL) 20 MG tablet, Take 1 tablet (20 mg) by mouth 3 times daily, Disp: 90 tablet, Rfl: 2     calcium citrate (CITRACAL) 950 (200 Ca) MG tablet, Take 1 tablet (950 mg) by mouth 3 times daily, Disp: 270 tablet, Rfl: 3     Calcium Citrate-Vitamin D (CALCIUM CITRATE +D PO), Take by mouth daily , Disp: , Rfl:      coenzyme Q-10 capsule, TAKE THREE CAPSULES BY MOUTH EVERY MORNING, Disp: , Rfl:      divalproex sodium extended-release (DEPAKOTE ER) 500 MG 24 hr tablet, Take 1 tablet (500 mg) by mouth 2 times daily, Disp: 186 tablet, Rfl: 11     escitalopram (LEXAPRO) 20 MG tablet, Take 20 mg by mouth daily, Disp: , Rfl:      fish oil-omega-3 fatty acids 1000 MG capsule, Take 2 g by mouth daily, Disp: , Rfl:      levothyroxine (SYNTHROID/LEVOTHROID) 100 MCG tablet, Take one tablet daily, 5 days a week, and 1/2 tablet, one day a week.  Overdue for labs, Disp: 60 tablet, Rfl: 0     multivitamin (CENTRUM SILVER) tablet, Take 1 tablet by mouth daily, Disp: , Rfl:      multivitamin, therapeutic with minerals  (MULTI-VITAMIN) TABS, Take 1 tablet by mouth daily, Disp: , Rfl:      Naproxen Sodium (ALEVE PO), Take 220 mg by mouth as needed for moderate pain, Disp: , Rfl:      Omega-3 Fatty Acids (FISH OIL PO), , Disp: , Rfl:      omeprazole (PRILOSEC) 10 MG CR capsule, Take 2 capsules (20 mg) by mouth daily Take by mouth 30-60 minutes before a meal., Disp: 30 capsule, Rfl: 0    Family History   Problem Relation Age of Onset     Hypothyroidism  Sisters      Psychiatry Son      ADHD     Neurological Daughter      migraines     Cardiovascular Mother      CHF     Diabetes in his 30's - type 1  Father      Diabetes dx in her late 50s - tx with insulin  Mother    2 sisters dx with insulin dependent diabetes. Many family members are overweight.     Social History   with 3 children. She denies smoking, drinking alcohol or using illicit drugs. Occupation: retired .    Review of Systems   Systemic:             As above   Eye:                      No eye symptoms   Carlito-Laryngeal:     Dysphagia for both solid and liquid foods on and off for the last 20 years - stable, no hoarseness, no cough     Breast:                  No breast symptoms  Cardiovascular:    No cardiovascular symptoms, no CP or palpitations   Pulmonary:           No pulmonary symptoms, no SOB or cough    Gastrointestinal:   No N/V, takes a stool softener; some fecal incontinence    Genitourinary:       Urinary incontinence, no increased thirst  Endocrine:            cold intolerance - all her life; no hot flashes   Neurological:        no headaches, no tremor, no numbness or tingling sensation; h/o falls due to diff maintaining her balance  Musculoskeletal:  No significant joint pain or muscle pain  Skin:                     No dry skin, no hair falling out   Psychological:     Depression -  As above               Vital Signs     Previous Weights:    Wt Readings from Last 10 Encounters:   11/24/21 47.4 kg (104 lb 8 oz)   02/06/20 49.9 kg (110 lb)    11/15/19 49 kg (108 lb)   07/22/19 49.5 kg (109 lb 3.2 oz)   04/08/19 48.6 kg (107 lb 3.2 oz)   09/14/18 51.3 kg (113 lb)   01/30/18 50.4 kg (111 lb 3 oz)   10/18/16 53 kg (116 lb 13.5 oz)   05/20/16 49.8 kg (109 lb 12.8 oz)   10/13/15 51.8 kg (114 lb 4 oz)        /78 (BP Location: Left arm, Patient Position: Chair, Cuff Size: Adult Regular)   Pulse 72   Wt 47.4 kg (104 lb 8 oz)   SpO2 98%   BMI 18.81 kg/m       General appearance: Thin, appears tired  Eyes: conjunctivae and extraocular motions are normal. Pupils are equal, round, and reactive to light. No lid lag, no stare.  HENT: oropharynx clear and moist; neck no JVD, no bruits, no thyromegaly, no palpable nodules  Cardiovascular: regular rhythm, no murmurs, distal pulses palpable, no edema  Respiratory: chest clear, no rales, no rhonchi  Neurologic: reflexes hyperactive, no resting tremor, gait spastic. Transitions with difficulty to the exam table.  Musculoskeletal: Increased tone  Psychiatric: affect depressed, judgment normal   Skin: warm, no lesions    Lab Results  I reviewed prior lab results documented in EPIC.     TSH   Date Value Ref Range Status   07/01/2020 0.07 (L) 0.40 - 4.00 mU/L Final     T4 Free   Date Value Ref Range Status   07/01/2020 1.26 0.76 - 1.46 ng/dL Final     Assessment     1. Hashimoto thyroiditis   Clinically, she does not have definite signs or symptoms suggestive of hyper or hypothyroidism. We are going to recheck the thyroid hormone levels and adjust the dose of levothyroxine accordingly.    2.  Osteoporosis at the hips, osteopenia of the spine, diagnosed in 2020, when the patient was started on treatment with Fosamax. She has been tolerating the Fosamax with no side effects. There is a high risk of fracture, given recurrent falls.  Plan:  Schedule a screening DEXA scan in 2022  Check BMP, albumin, vitamin D and phosphorus level.   Continue to take the calcium and vitamin D supplements and have one serving of dairy  products daily.    Orders Placed This Encounter   Procedures     Dexa hip/pelvis/spine     Dexa Wrist/Heel     TSH     T4 free     25 Hydroxyvitamin D2 and D3     Albumin level     Basic metabolic panel     32 minutes spent on the date of the encounter doing chart review, history and exam, documentation and further activities as noted above.

## 2021-11-26 NOTE — RESULT ENCOUNTER NOTE
The thyroid hormone levels are normal. You may continue to take the same dose of levothyroxine.

## 2021-12-16 DIAGNOSIS — G40.109 FOCAL EPILEPSY (H): ICD-10-CM

## 2021-12-17 RX ORDER — DIVALPROEX SODIUM 500 MG/1
500 TABLET, EXTENDED RELEASE ORAL 2 TIMES DAILY
Qty: 62 TABLET | Refills: 2 | Status: SHIPPED | OUTPATIENT
Start: 2021-12-17 | End: 2022-01-20

## 2021-12-17 NOTE — TELEPHONE ENCOUNTER
Lat seen by Dr.Shams Cruz 2/6/2020, with recommended follow up 6 months.  Patient cancelled appointment 8/13/2020, and failed virtual appointment 9/14/2020    Has a visit scheduled 1/24/2022 with Dr.Shams Cruz.    Will initiate and pend a prescription for MD review  Chart routed to MD

## 2022-01-20 DIAGNOSIS — G40.109 FOCAL EPILEPSY (H): ICD-10-CM

## 2022-01-20 RX ORDER — DIVALPROEX SODIUM 500 MG/1
500 TABLET, EXTENDED RELEASE ORAL 2 TIMES DAILY
Qty: 62 TABLET | Refills: 0 | Status: SHIPPED | OUTPATIENT
Start: 2022-01-20 | End: 2022-01-24

## 2022-01-20 NOTE — TELEPHONE ENCOUNTER
Medication Refill request received for   Pending Prescriptions:                       Disp   Refills    divalproex sodium extended-release (DEPAK*62 tab*2            Sig: Take 1 tablet (500 mg) by mouth 2 times daily      Last Written Prescription Date:  12/17/2021  Last Fill Quantity: 62,   # refills: 2 Length of last prescription in time: 3 months    Last Office Visit : 9/14/2020  Future Office visit:  1/24/2021    Refill request was approved per [place] Medication Refill Protocol requirements.

## 2022-01-20 NOTE — TELEPHONE ENCOUNTER
Patient will run out of medication tomorrow. Has visit with Sherri on Monday so just needs small supply to get through to appointment

## 2022-01-24 ENCOUNTER — OFFICE VISIT (OUTPATIENT)
Dept: NEUROLOGY | Facility: CLINIC | Age: 57
End: 2022-01-24
Payer: COMMERCIAL

## 2022-01-24 VITALS
TEMPERATURE: 97.1 F | DIASTOLIC BLOOD PRESSURE: 73 MMHG | HEIGHT: 62 IN | WEIGHT: 104.8 LBS | BODY MASS INDEX: 19.29 KG/M2 | SYSTOLIC BLOOD PRESSURE: 118 MMHG | HEART RATE: 74 BPM

## 2022-01-24 DIAGNOSIS — G40.109 FOCAL EPILEPSY (H): Primary | ICD-10-CM

## 2022-01-24 LAB — VALPROATE SERPL-MCNC: 101 MG/L

## 2022-01-24 PROCEDURE — 80165 DIPROPYLACETIC ACID FREE: CPT | Performed by: PSYCHIATRY & NEUROLOGY

## 2022-01-24 PROCEDURE — 80164 ASSAY DIPROPYLACETIC ACD TOT: CPT | Performed by: PSYCHIATRY & NEUROLOGY

## 2022-01-24 RX ORDER — POLYETHYLENE GLYCOL 3350 17 G/17G
8.5 POWDER, FOR SOLUTION ORAL
COMMUNITY

## 2022-01-24 RX ORDER — CARBIDOPA AND LEVODOPA 25; 100 MG/1; MG/1
1 TABLET ORAL 2 TIMES DAILY
COMMUNITY
Start: 2021-10-25

## 2022-01-24 RX ORDER — DIVALPROEX SODIUM 500 MG/1
500 TABLET, EXTENDED RELEASE ORAL 2 TIMES DAILY
Qty: 180 TABLET | Refills: 3 | Status: SHIPPED | OUTPATIENT
Start: 2022-01-24

## 2022-01-24 ASSESSMENT — MIFFLIN-ST. JEOR: SCORE: 1008.62

## 2022-01-24 ASSESSMENT — PATIENT HEALTH QUESTIONNAIRE - PHQ9: SUM OF ALL RESPONSES TO PHQ QUESTIONS 1-9: 10

## 2022-01-24 NOTE — PROGRESS NOTES
"[facility] Epilepsy Care Progress Note      Patient:  Sheela Davis  :  1965   Age:  56 year old   Today's Office Visit:  2022    Epilepsy Data:  The patient had an episode of loss of consciousness on 2019.  She was very stressed when she went to bed.  Her daughter came home and saw her in the bed doing something unusual with her hands up in the air, but she does not recall that.  Her daughter called 911, and she was taken to the hospital.  She was intubated and was placed on Keppra. EEG was inconclusive as was read as \"The patient was on propofol, and there was suppression of activities and diffuse slowing; however, there were no epileptiform discharges.\"  Brain MRI on 2019 showed no acute intracranial abnormality, asymmetric atrophy of the cerebellum, less parenchymal loss in the cerebral hemisphere, patent intracranial circulation, patent carotid and vertebral arteries.  Due to the patient's history of depression and anxiety, Keppra was switched to Depakote. The patient was extubated.  She does not recall anything during the 3 days of hospitalization.  She has been continued on Depakote since then.  She did not have any similar episodes in the past or since the first episode.  She denies a history of childhood seizures, febrile seizures, meningitis, encephalitis, family history of epilepsy or significant head trauma.     EEG on 4/15/19 showed focal slowing and occasional epileptiform discharges over the left frontotemporal region during sleep.      History of Present Illness:     Ms. Coker returns to visit today for a follow up on her epilepsy. She is taking Depakote  mg bid.    When she tries to do something else or talk while walking, even when she walks with the walker, she may fall.      Ataxia of unknown etiology: Stable.  She is no longer following Dr. Oviedo. She is following with Dr. Klein at [facility].     Current Outpatient " Medications   Medication Sig Dispense Refill     alendronate (FOSAMAX) 70 MG tablet Take 1 tablet (70 mg) by mouth every 7 days Take 60 minutes before am meal with 8 oz. water. Remain upright for 30 minutes. 12 tablet 3     alendronate (FOSAMAX) 70 MG tablet Take 1 tablet (70 mg) by mouth every 7 days Take 60 minutes before am meal with 8 oz. water. Remain upright for 30 minutes.       calcium citrate (CITRACAL) 950 (200 Ca) MG tablet Take 1 tablet (950 mg) by mouth 3 times daily 270 tablet 3     Calcium Citrate-Vitamin D (CALCIUM CITRATE +D PO) Take by mouth daily        carbidopa-levodopa (SINEMET)  MG tablet Take 1 tablet by mouth 2 times daily       coenzyme Q-10 capsule TAKE THREE CAPSULES BY MOUTH EVERY MORNING       divalproex sodium extended-release (DEPAKOTE ER) 500 MG 24 hr tablet Take 1 tablet (500 mg) by mouth 2 times daily 62 tablet 0     escitalopram (LEXAPRO) 20 MG tablet Take 20 mg by mouth daily       fish oil-omega-3 fatty acids 1000 MG capsule Take 2 g by mouth daily       levothyroxine (SYNTHROID/LEVOTHROID) 100 MCG tablet Take one tablet daily, 5 days a week, and 1/2 tablet, one day a week.  Overdue for labs 60 tablet 3     multivitamin (CENTRUM SILVER) tablet Take 1 tablet by mouth daily       multivitamin, therapeutic with minerals (MULTI-VITAMIN) TABS Take 1 tablet by mouth daily       Omega-3 Fatty Acids (FISH OIL PO)        polyethylene glycol (MIRALAX) 17 GM/Dose powder Take 8.5 g by mouth       baclofen (LIORESAL) 20 MG tablet Take 1 tablet (20 mg) by mouth 3 times daily (Patient not taking: Reported on 1/24/2022) 90 tablet 2     Naproxen Sodium (ALEVE PO) Take 220 mg by mouth as needed for moderate pain (Patient not taking: Reported on 1/24/2022)       omeprazole (PRILOSEC) 10 MG CR capsule Take 2 capsules (20 mg) by mouth daily Take by mouth 30-60 minutes before a meal. (Patient not taking: Reported on 1/24/2022) 30 capsule 0        Perceived AED Side Effects:  No    Medication  "Notes:        AED Medication Compliance:  compliant most of the time    Exam:    /73   Pulse 74   Temp 97.1  F (36.2  C) (Temporal)   Ht 5' 2\" (157.5 cm)   Wt 104 lb 12.8 oz (47.5 kg)   BMI 19.17 kg/m       Wt Readings from Last 5 Encounters:   01/24/22 104 lb 12.8 oz (47.5 kg)   11/24/21 104 lb 8 oz (47.4 kg)   02/06/20 110 lb (49.9 kg)   11/15/19 108 lb (49 kg)   07/22/19 109 lb 3.2 oz (49.5 kg)     General Appearance: Alert, awake, cooperative, pleasant, NAD  Gait: spastic   Attention Span:  Normal  Language/speech: no aphasia or dysarthria  Extraocular Movements:  Normal  Facial Strength:  Normal  Tongue Strength:  Normal  Motor Exam: motor strength 5/5 except LLE: 4+/5, increased tone in lower extremities.     Assessment and Plan:    1. Focal epilepsy: the patient did not have any seizures since starting Depakote. Currently on Depakote 500 mg bid, no known side effects.     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 She is following at [facility]. I suggested to discuss restarting Baclofen with her neurologist at [facility].      - Continue Depakote  mg bid  - Obtain Depakote level   - RTC in 1 year      As described above, I met with the patient for ~25 minutes and during this time counseling was within 50% of the visit time.  Alice Joshi MD                      "

## 2022-01-26 LAB — VALPROATE FREE SERPL-MCNC: 12.8 UG/ML

## 2022-02-15 ENCOUNTER — TELEPHONE (OUTPATIENT)
Dept: NEUROLOGY | Facility: CLINIC | Age: 57
End: 2022-02-15
Payer: COMMERCIAL

## 2022-02-21 DIAGNOSIS — G40.109 FOCAL EPILEPSY (H): ICD-10-CM

## 2022-02-21 RX ORDER — DIVALPROEX SODIUM 500 MG/1
500 TABLET, EXTENDED RELEASE ORAL 2 TIMES DAILY
Qty: 180 TABLET | Refills: 3 | Status: CANCELLED | OUTPATIENT
Start: 2022-02-21

## 2022-02-21 NOTE — TELEPHONE ENCOUNTER
Patient will be out of medication Wednesday (divalproex sodium extended release). Patient states that she usually gets a three month supply but this time only received a one month supply. (Patient has already contacted pharmacy)

## 2022-02-21 NOTE — TELEPHONE ENCOUNTER
"Chart reviewed.  Last visit with Dr.Shams Cruz 1/24/2022  Plan at that time:-  \"- Continue Depakote  mg bid  - Obtain Depakote level   - RTC in 1 year\"    Last prescription sent 1/24/22        Voice message left for patient telling her to talk with the pharmacist.    "

## 2022-06-04 ENCOUNTER — HEALTH MAINTENANCE LETTER (OUTPATIENT)
Age: 57
End: 2022-06-04

## 2022-10-10 ENCOUNTER — HEALTH MAINTENANCE LETTER (OUTPATIENT)
Age: 57
End: 2022-10-10

## 2022-10-26 DIAGNOSIS — E55.9 VITAMIN D DEFICIENCY, UNSPECIFIED: ICD-10-CM

## 2022-10-29 RX ORDER — IBUPROFEN 200 MG
1 CAPSULE ORAL 3 TIMES DAILY
Qty: 270 TABLET | Refills: 3 | Status: SHIPPED | OUTPATIENT
Start: 2022-10-29

## 2022-10-29 NOTE — TELEPHONE ENCOUNTER
Calcium Citrate Oral Tablet 950 (200 Ca) MG      Last Written Prescription Date:  8-12-21  Last Fill Quantity: 270,   # refills: 3  Last Office Visit : 1-24-22 Adi                               11-24-21  Radulescu  Future Office visit:  1-24-23 Kusum                                  11-23-22 Radulescu    Routing refill request to provider for review/approval because:  ? Ordering provider /service.   Not in last clinic note for Neurology      
Normal

## 2022-11-11 ENCOUNTER — TELEPHONE (OUTPATIENT)
Dept: ENDOCRINOLOGY | Facility: CLINIC | Age: 57
End: 2022-11-11

## 2023-02-18 ENCOUNTER — HEALTH MAINTENANCE LETTER (OUTPATIENT)
Age: 58
End: 2023-02-18

## 2023-06-10 ENCOUNTER — HEALTH MAINTENANCE LETTER (OUTPATIENT)
Age: 58
End: 2023-06-10

## 2024-02-12 ENCOUNTER — TRANSCRIBE ORDERS (OUTPATIENT)
Dept: OTHER | Age: 59
End: 2024-02-12

## 2024-02-12 ENCOUNTER — TRANSFERRED RECORDS (OUTPATIENT)
Dept: HEALTH INFORMATION MANAGEMENT | Facility: CLINIC | Age: 59
End: 2024-02-12
Payer: COMMERCIAL

## 2024-02-12 DIAGNOSIS — G11.9: Primary | ICD-10-CM

## 2024-08-03 ENCOUNTER — HEALTH MAINTENANCE LETTER (OUTPATIENT)
Age: 59
End: 2024-08-03

## 2025-04-03 ENCOUNTER — TRANSFERRED RECORDS (OUTPATIENT)
Dept: HEALTH INFORMATION MANAGEMENT | Facility: CLINIC | Age: 60
End: 2025-04-03
Payer: COMMERCIAL

## 2025-04-03 ENCOUNTER — MEDICAL CORRESPONDENCE (OUTPATIENT)
Dept: HEALTH INFORMATION MANAGEMENT | Facility: CLINIC | Age: 60
End: 2025-04-03
Payer: COMMERCIAL